# Patient Record
Sex: MALE | Race: WHITE | Employment: OTHER | URBAN - METROPOLITAN AREA
[De-identification: names, ages, dates, MRNs, and addresses within clinical notes are randomized per-mention and may not be internally consistent; named-entity substitution may affect disease eponyms.]

---

## 2017-10-06 ENCOUNTER — TELEPHONE (OUTPATIENT)
Dept: PRIMARY CARE CLINIC | Age: 57
End: 2017-10-06

## 2017-10-07 ENCOUNTER — OFFICE VISIT (OUTPATIENT)
Dept: PRIMARY CARE CLINIC | Age: 57
End: 2017-10-07

## 2017-10-07 VITALS
WEIGHT: 231 LBS | BODY MASS INDEX: 29.65 KG/M2 | DIASTOLIC BLOOD PRESSURE: 90 MMHG | HEIGHT: 74 IN | OXYGEN SATURATION: 94 % | RESPIRATION RATE: 18 BRPM | HEART RATE: 69 BPM | SYSTOLIC BLOOD PRESSURE: 142 MMHG | TEMPERATURE: 96.2 F

## 2017-10-07 DIAGNOSIS — R97.20 ELEVATED PSA: Primary | ICD-10-CM

## 2017-10-07 DIAGNOSIS — N40.1 BPH WITH URINARY OBSTRUCTION: ICD-10-CM

## 2017-10-07 DIAGNOSIS — N13.8 BPH WITH URINARY OBSTRUCTION: ICD-10-CM

## 2017-10-07 DIAGNOSIS — L73.9 FOLLICULITIS: ICD-10-CM

## 2017-10-07 DIAGNOSIS — R82.998 LEUKOCYTES IN URINE: ICD-10-CM

## 2017-10-07 LAB
BILIRUBIN, POC: ABNORMAL
BLOOD URINE, POC: ABNORMAL
CLARITY, POC: ABNORMAL
COLOR, POC: YELLOW
GLUCOSE URINE, POC: ABNORMAL
KETONES, POC: ABNORMAL
LEUKOCYTE EST, POC: ABNORMAL
NITRITE, POC: ABNORMAL
PH, POC: 6.5
PROTEIN, POC: ABNORMAL
SPECIFIC GRAVITY, POC: 1.01
UROBILINOGEN, POC: ABNORMAL

## 2017-10-07 PROCEDURE — 99213 OFFICE O/P EST LOW 20 MIN: CPT | Performed by: FAMILY MEDICINE

## 2017-10-07 PROCEDURE — 81002 URINALYSIS NONAUTO W/O SCOPE: CPT | Performed by: FAMILY MEDICINE

## 2017-10-07 RX ORDER — TRIAMCINOLONE ACETONIDE 1 MG/G
CREAM TOPICAL
Qty: 60 G | Refills: 1 | Status: SHIPPED | OUTPATIENT
Start: 2017-10-07 | End: 2018-05-30

## 2017-10-07 RX ORDER — TAMSULOSIN HYDROCHLORIDE 0.4 MG/1
0.4 CAPSULE ORAL DAILY
COMMUNITY
End: 2017-10-21 | Stop reason: SDUPTHER

## 2017-10-07 RX ORDER — CIPROFLOXACIN 500 MG/1
TABLET, FILM COATED ORAL
Qty: 20 TABLET | Refills: 0 | Status: SHIPPED | OUTPATIENT
Start: 2017-10-07 | End: 2017-10-17

## 2017-10-07 RX ORDER — CEPHALEXIN 500 MG/1
500 CAPSULE ORAL 3 TIMES DAILY
Qty: 30 CAPSULE | Refills: 0 | Status: SHIPPED | OUTPATIENT
Start: 2017-10-07 | End: 2017-10-07

## 2017-10-07 ASSESSMENT — PATIENT HEALTH QUESTIONNAIRE - PHQ9
SUM OF ALL RESPONSES TO PHQ QUESTIONS 1-9: 0
2. FEELING DOWN, DEPRESSED OR HOPELESS: 0
1. LITTLE INTEREST OR PLEASURE IN DOING THINGS: 0
SUM OF ALL RESPONSES TO PHQ9 QUESTIONS 1 & 2: 0

## 2017-10-07 NOTE — PROGRESS NOTES
Subjective  Yesi Jeffries, 61 y.o. male presents 10/7/2017 with  Chief Complaint   Patient presents with    Other     patient is here to discuss a prostates issue and other issues. would like to speak with doctor       HPI  Patient comes in for discussion of prostate problems. He admits he has difficulty urinating and sometimes has to force where he turns almost blue an effort to urinate. Patient has been seen by Dr. Grabiel South however, has not had a follow-up visit. Patient denies any flank pain, fever, chills, nausea, emesis, shortness of breath or chest pain. Patient admits he does get occasional abdominal distention and will straight catheter himself. Patient has not seen any hematuria for the past several months. [facility]    Patient Histories  History reviewed. No pertinent past medical history. Past Surgical History:   Procedure Laterality Date    HERNIA REPAIR      age 9    TONSILLECTOMY AND ADENOIDECTOMY      age 8     No Known Allergies  Social History     Social History    Marital status: Unknown     Spouse name: N/A    Number of children: N/A    Years of education: N/A     Occupational History    Not on file. Social History Main Topics    Smoking status: Former Smoker     Packs/day: 1.00     Years: 30.00     Types: Cigarettes     Quit date: 10/7/2010    Smokeless tobacco: Never Used    Alcohol use No    Drug use:      Frequency: 4.0 times per week     Types: Marijuana    Sexual activity: Not on file     Other Topics Concern    Not on file     Social History Narrative    No narrative on file     Family History   Problem Relation Age of Onset    Breast Cancer Mother     Cancer Brother      thyroid     No current outpatient prescriptions on file prior to visit. No current facility-administered medications on file prior to visit. Review of Systems   Respiratory: Negative for chest tightness, shortness of breath and wheezing.     Cardiovascular: Negative for chest pain, cephALEXin (KEFLEX) 500 MG capsule   4. Leukocytes in urine  Urine Culture     Orders Placed This Encounter   Procedures    Urine Culture     Standing Status:   Future     Number of Occurrences:   1     Standing Expiration Date:   10/7/2018     Order Specific Question:   Specify (ex-cath, midstream, cysto, etc)? Answer:   midstream    Psa screening     Standing Status:   Future     Standing Expiration Date:   2017    Comprehensive Metabolic Panel     Standing Status:   Future     Standing Expiration Date:   2017    CBC Auto Differential     Standing Status:   Future     Standing Expiration Date:   2017   [address] [place] [address] Urology     Referral Priority:   Routine     Referral Type:   Consult for Advice and Opinion     Referral Reason:   Specialty Services Required     Referred to Provider:   Natanael Saini MD     Requested Specialty:   Urology     Number of Visits Requested:   1    POCT Urinalysis no Micro     Orders Placed This Encounter   Medications    DISCONTD: cephALEXin (KEFLEX) 500 MG capsule     Sig: Take 1 capsule by mouth 3 times daily     Dispense:  30 capsule     Refill:  0    triamcinolone (KENALOG) 0.1 % cream     Sig: Apply topically 2 times daily. Dispense:  60 g     Refill:  1    ciprofloxacin (CIPRO) 500 MG tablet     Sig: One bid     Dispense:  20 tablet     Refill:  0     Medications Discontinued During This Encounter   Medication Reason    cephALEXin (KEFLEX) 500 MG capsule        PATIENT COUNSELED TO CONTINUE ALL CURRENT MEDS     Outpatient Prescriptions Marked as Taking for the 10/7/17 encounter (Office Visit) with Natasha Miner DO   Medication Sig Dispense Refill    triamcinolone (KENALOG) 0.1 % cream Apply topically 2 times daily.  60 g 1    [] ciprofloxacin (CIPRO) 500 MG tablet One bid 20 tablet 0    [DISCONTINUED] tamsulosin (FLOMAX) 0.4 MG capsule Take 0.4 mg by mouth daily             HEALTH MAINTENANCE LIST

## 2017-10-09 DIAGNOSIS — R82.998 LEUKOCYTES IN URINE: ICD-10-CM

## 2017-10-12 LAB
ORGANISM: ABNORMAL
URINE CULTURE, ROUTINE: ABNORMAL
URINE CULTURE, ROUTINE: ABNORMAL

## 2017-10-21 ENCOUNTER — OFFICE VISIT (OUTPATIENT)
Dept: PRIMARY CARE CLINIC | Age: 57
End: 2017-10-21

## 2017-10-21 VITALS
SYSTOLIC BLOOD PRESSURE: 130 MMHG | HEART RATE: 72 BPM | RESPIRATION RATE: 14 BRPM | BODY MASS INDEX: 29.52 KG/M2 | WEIGHT: 230 LBS | DIASTOLIC BLOOD PRESSURE: 88 MMHG | HEIGHT: 74 IN | TEMPERATURE: 97.9 F

## 2017-10-21 DIAGNOSIS — L30.9 ECZEMA, UNSPECIFIED TYPE: ICD-10-CM

## 2017-10-21 DIAGNOSIS — R35.0 URINARY FREQUENCY: Primary | ICD-10-CM

## 2017-10-21 DIAGNOSIS — N40.1 BENIGN PROSTATIC HYPERPLASIA WITH LOWER URINARY TRACT SYMPTOMS, SYMPTOM DETAILS UNSPECIFIED: ICD-10-CM

## 2017-10-21 LAB
BILIRUBIN, POC: NORMAL
BLOOD URINE, POC: NORMAL
CLARITY, POC: NORMAL
COLOR, POC: YELLOW
GLUCOSE URINE, POC: NORMAL
KETONES, POC: NORMAL
LEUKOCYTE EST, POC: NORMAL
NITRITE, POC: NORMAL
PH, POC: 6.5
PROTEIN, POC: NORMAL
SPECIFIC GRAVITY, POC: 1.01
UROBILINOGEN, POC: 3.5

## 2017-10-21 PROCEDURE — 81003 URINALYSIS AUTO W/O SCOPE: CPT | Performed by: FAMILY MEDICINE

## 2017-10-21 PROCEDURE — 99212 OFFICE O/P EST SF 10 MIN: CPT | Performed by: FAMILY MEDICINE

## 2017-10-21 RX ORDER — TRIAMCINOLONE ACETONIDE 1 MG/G
CREAM TOPICAL
Qty: 60 G | Refills: 1 | Status: SHIPPED | OUTPATIENT
Start: 2017-10-21 | End: 2018-05-30

## 2017-10-21 RX ORDER — TRIAMCINOLONE ACETONIDE 1 MG/G
CREAM TOPICAL
Qty: 60 G | Refills: 1 | Status: SHIPPED | OUTPATIENT
Start: 2017-10-21 | End: 2017-10-21 | Stop reason: SDUPTHER

## 2017-10-21 RX ORDER — TAMSULOSIN HYDROCHLORIDE 0.4 MG/1
0.4 CAPSULE ORAL 2 TIMES DAILY
Qty: 60 CAPSULE | Refills: 5 | Status: SHIPPED | OUTPATIENT
Start: 2017-10-21 | End: 2018-05-30 | Stop reason: SDUPTHER

## 2017-10-21 NOTE — PROGRESS NOTES
Orders Placed This Encounter   Procedures   [address] Urology     Referral Priority:   Routine     Referral Type:   Consult for Advice and Opinion     Referral Reason:   Specialty Services Required     Referred to Provider:   Mitra Waller MD     Requested Specialty:   Urology     Number of Visits Requested:   1    POCT Urinalysis No Micro (Auto)     Orders Placed This Encounter   Medications    DISCONTD: triamcinolone (KENALOG) 0.1 % cream     Sig: Apply topically 2 times daily. Dispense:  60 g     Refill:  1    tamsulosin (FLOMAX) 0.4 MG capsule     Sig: Take 1 capsule by mouth 2 times daily     Dispense:  60 capsule     Refill:  5    triamcinolone (KENALOG) 0.1 % cream     Sig: Apply topically 2 times daily. Dispense:  60 g     Refill:  1     Medications Discontinued During This Encounter   Medication Reason    tamsulosin (FLOMAX) 0.4 MG capsule Reorder    triamcinolone (KENALOG) 0.1 % cream Reorder       PATIENT COUNSELED TO CONTINUE ALL CURRENT MEDS     Outpatient Prescriptions Marked as Taking for the 10/21/17 encounter (Office Visit) with Ibrahima Hoyt DO   Medication Sig Dispense Refill    tamsulosin (FLOMAX) 0.4 MG capsule Take 1 capsule by mouth 2 times daily 60 capsule 5    triamcinolone (KENALOG) 0.1 % cream Apply topically 2 times daily. 60 g 1    triamcinolone (KENALOG) 0.1 % cream Apply topically 2 times daily.  60 g [facility] Maintenance   Topic Date Due    Hepatitis C screen  1960    HIV screen  03/17/1974    DTaP/Tdap/Td vaccine (1 - Tdap) 03/17/1978    Lipid screen  03/17/1999    Diabetes screen  03/17/1999    Colon cancer screen colonoscopy  03/17/2009    Flu vaccine (1) 11/21/2017 (Originally 9/1/2017)        [facility] Maintenance Due   Topic Date Due    Hepatitis C screen  1960    HIV screen  03/17/1974    DTaP/Tdap/Td

## 2017-10-22 ASSESSMENT — ENCOUNTER SYMPTOMS
SHORTNESS OF BREATH: 0
WHEEZING: 0
CHEST TIGHTNESS: 0
ABDOMINAL PAIN: 0

## 2017-10-29 ASSESSMENT — ENCOUNTER SYMPTOMS
ABDOMINAL PAIN: 0
COUGH: 0
SHORTNESS OF BREATH: 0
BLOOD IN STOOL: 0

## 2018-05-29 DIAGNOSIS — R35.0 URINARY FREQUENCY: ICD-10-CM

## 2018-05-29 DIAGNOSIS — N40.1 BENIGN PROSTATIC HYPERPLASIA WITH LOWER URINARY TRACT SYMPTOMS, SYMPTOM DETAILS UNSPECIFIED: ICD-10-CM

## 2018-05-29 RX ORDER — TAMSULOSIN HYDROCHLORIDE 0.4 MG/1
CAPSULE ORAL
Qty: 60 CAPSULE | Refills: 4 | OUTPATIENT
Start: 2018-05-29

## 2018-05-30 ENCOUNTER — OFFICE VISIT (OUTPATIENT)
Dept: PRIMARY CARE CLINIC | Age: 58
End: 2018-05-30

## 2018-05-30 VITALS
BODY MASS INDEX: 29.39 KG/M2 | OXYGEN SATURATION: 98 % | DIASTOLIC BLOOD PRESSURE: 76 MMHG | HEART RATE: 70 BPM | RESPIRATION RATE: 14 BRPM | TEMPERATURE: 97.1 F | SYSTOLIC BLOOD PRESSURE: 122 MMHG | WEIGHT: 229 LBS | HEIGHT: 74 IN

## 2018-05-30 DIAGNOSIS — L73.9 FOLLICULITIS: ICD-10-CM

## 2018-05-30 DIAGNOSIS — R35.0 URINARY FREQUENCY: Primary | ICD-10-CM

## 2018-05-30 DIAGNOSIS — N40.1 BENIGN PROSTATIC HYPERPLASIA WITH LOWER URINARY TRACT SYMPTOMS, SYMPTOM DETAILS UNSPECIFIED: ICD-10-CM

## 2018-05-30 PROCEDURE — 99212 OFFICE O/P EST SF 10 MIN: CPT | Performed by: FAMILY MEDICINE

## 2018-05-30 RX ORDER — TAMSULOSIN HYDROCHLORIDE 0.4 MG/1
0.4 CAPSULE ORAL 2 TIMES DAILY
Qty: 60 CAPSULE | Refills: 5 | Status: SHIPPED | OUTPATIENT
Start: 2018-05-30 | End: 2018-12-26 | Stop reason: SDUPTHER

## 2018-05-30 RX ORDER — CEPHALEXIN 500 MG/1
CAPSULE ORAL
Qty: 28 CAPSULE | Refills: 0 | Status: CANCELLED | OUTPATIENT
Start: 2018-05-30

## 2018-05-30 RX ORDER — DOXYCYCLINE 100 MG/1
CAPSULE ORAL
Status: CANCELLED | OUTPATIENT
Start: 2018-05-30

## 2018-05-30 ASSESSMENT — ENCOUNTER SYMPTOMS
DIARRHEA: 0
NAIL CHANGES: 0
EYE DISCHARGE: 0
APNEA: 0
SHORTNESS OF BREATH: 0
WHEEZING: 0
ABDOMINAL PAIN: 0
COLOR CHANGE: 0
EYE PAIN: 0
STRIDOR: 0
FACIAL SWELLING: 0
CHOKING: 0
SORE THROAT: 0
CONSTIPATION: 0
EYE REDNESS: 0
CHEST TIGHTNESS: 0
VOMITING: 0
PHOTOPHOBIA: 0
COUGH: 0
NAUSEA: 0
RHINORRHEA: 0

## 2018-06-01 ENCOUNTER — TELEPHONE (OUTPATIENT)
Dept: PRIMARY CARE CLINIC | Age: 58
End: 2018-06-01

## 2018-12-24 DIAGNOSIS — N40.1 BENIGN PROSTATIC HYPERPLASIA WITH LOWER URINARY TRACT SYMPTOMS, SYMPTOM DETAILS UNSPECIFIED: ICD-10-CM

## 2018-12-24 DIAGNOSIS — R35.0 URINARY FREQUENCY: ICD-10-CM

## 2018-12-26 ENCOUNTER — OFFICE VISIT (OUTPATIENT)
Dept: PRIMARY CARE CLINIC | Age: 58
End: 2018-12-26

## 2018-12-26 VITALS
SYSTOLIC BLOOD PRESSURE: 128 MMHG | HEIGHT: 74 IN | WEIGHT: 228 LBS | DIASTOLIC BLOOD PRESSURE: 80 MMHG | HEART RATE: 68 BPM | BODY MASS INDEX: 29.26 KG/M2 | RESPIRATION RATE: 16 BRPM | TEMPERATURE: 97.5 F | OXYGEN SATURATION: 95 %

## 2018-12-26 DIAGNOSIS — R97.20 ELEVATED PSA: ICD-10-CM

## 2018-12-26 DIAGNOSIS — Z12.5 PROSTATE CANCER SCREENING: Primary | ICD-10-CM

## 2018-12-26 DIAGNOSIS — M19.011 PRIMARY OSTEOARTHRITIS OF BOTH SHOULDERS: ICD-10-CM

## 2018-12-26 DIAGNOSIS — R35.0 URINARY FREQUENCY: ICD-10-CM

## 2018-12-26 DIAGNOSIS — M19.012 PRIMARY OSTEOARTHRITIS OF BOTH SHOULDERS: ICD-10-CM

## 2018-12-26 DIAGNOSIS — N40.1 BENIGN PROSTATIC HYPERPLASIA WITH LOWER URINARY TRACT SYMPTOMS, SYMPTOM DETAILS UNSPECIFIED: ICD-10-CM

## 2018-12-26 PROCEDURE — 99212 OFFICE O/P EST SF 10 MIN: CPT | Performed by: FAMILY MEDICINE

## 2018-12-26 RX ORDER — TAMSULOSIN HYDROCHLORIDE 0.4 MG/1
0.4 CAPSULE ORAL 2 TIMES DAILY
Qty: 180 CAPSULE | Refills: 1 | Status: SHIPPED | OUTPATIENT
Start: 2018-12-26 | End: 2019-04-16 | Stop reason: SDUPTHER

## 2018-12-26 RX ORDER — TAMSULOSIN HYDROCHLORIDE 0.4 MG/1
CAPSULE ORAL
Qty: 60 CAPSULE | Refills: 4 | OUTPATIENT
Start: 2018-12-26

## 2018-12-26 RX ORDER — PREDNISONE 10 MG/1
TABLET ORAL
Qty: 25 TABLET | Refills: 0 | Status: SHIPPED | OUTPATIENT
Start: 2018-12-26 | End: 2019-01-09

## 2018-12-26 ASSESSMENT — PATIENT HEALTH QUESTIONNAIRE - PHQ9
SUM OF ALL RESPONSES TO PHQ9 QUESTIONS 1 & 2: 0
SUM OF ALL RESPONSES TO PHQ QUESTIONS 1-9: 0
SUM OF ALL RESPONSES TO PHQ QUESTIONS 1-9: 0
2. FEELING DOWN, DEPRESSED OR HOPELESS: 0
1. LITTLE INTEREST OR PLEASURE IN DOING THINGS: 0

## 2019-01-02 ASSESSMENT — ENCOUNTER SYMPTOMS
PHOTOPHOBIA: 0
EYE DISCHARGE: 0
NAUSEA: 0
CHEST TIGHTNESS: 0
CONSTIPATION: 0
RESPIRATORY NEGATIVE: 1
APNEA: 0
VOMITING: 0
ABDOMINAL PAIN: 0
BLOOD IN STOOL: 0
BACK PAIN: 0
DIARRHEA: 0
EYES NEGATIVE: 1
SHORTNESS OF BREATH: 0
COUGH: 0
GASTROINTESTINAL NEGATIVE: 1

## 2019-01-30 ENCOUNTER — OFFICE VISIT (OUTPATIENT)
Dept: PRIMARY CARE CLINIC | Age: 59
End: 2019-01-30
Payer: COMMERCIAL

## 2019-01-30 VITALS
RESPIRATION RATE: 14 BRPM | DIASTOLIC BLOOD PRESSURE: 78 MMHG | WEIGHT: 228 LBS | HEIGHT: 74 IN | TEMPERATURE: 97.8 F | HEART RATE: 59 BPM | BODY MASS INDEX: 29.26 KG/M2 | OXYGEN SATURATION: 96 % | SYSTOLIC BLOOD PRESSURE: 132 MMHG

## 2019-01-30 DIAGNOSIS — R30.0 BURNING WITH URINATION: ICD-10-CM

## 2019-01-30 DIAGNOSIS — N39.0 URINARY TRACT INFECTION WITHOUT HEMATURIA, SITE UNSPECIFIED: Primary | ICD-10-CM

## 2019-01-30 DIAGNOSIS — N39.0 URINARY TRACT INFECTION WITHOUT HEMATURIA, SITE UNSPECIFIED: ICD-10-CM

## 2019-01-30 LAB
BILIRUBIN, POC: NORMAL
BLOOD URINE, POC: NORMAL
CLARITY, POC: NORMAL
COLOR, POC: YELLOW
GLUCOSE URINE, POC: NORMAL
KETONES, POC: NORMAL
LEUKOCYTE EST, POC: NORMAL
NITRITE, POC: NORMAL
PH, POC: 6
PROTEIN, POC: NORMAL
SPECIFIC GRAVITY, POC: 1.02
UROBILINOGEN, POC: 3.5

## 2019-01-30 PROCEDURE — 81003 URINALYSIS AUTO W/O SCOPE: CPT | Performed by: NURSE PRACTITIONER

## 2019-01-30 PROCEDURE — 99213 OFFICE O/P EST LOW 20 MIN: CPT | Performed by: NURSE PRACTITIONER

## 2019-01-30 RX ORDER — NITROFURANTOIN 25; 75 MG/1; MG/1
100 CAPSULE ORAL 2 TIMES DAILY
Qty: 10 CAPSULE | Refills: 0 | Status: SHIPPED | OUTPATIENT
Start: 2019-01-30 | End: 2019-02-04

## 2019-01-30 ASSESSMENT — ENCOUNTER SYMPTOMS
NAUSEA: 0
VOMITING: 0

## 2019-02-02 LAB
ORGANISM: ABNORMAL
URINE CULTURE, ROUTINE: ABNORMAL
URINE CULTURE, ROUTINE: ABNORMAL

## 2019-02-04 ENCOUNTER — TELEPHONE (OUTPATIENT)
Dept: PRIMARY CARE CLINIC | Age: 59
End: 2019-02-04

## 2019-02-04 DIAGNOSIS — N39.0 URINARY TRACT INFECTION WITHOUT HEMATURIA, SITE UNSPECIFIED: Primary | ICD-10-CM

## 2019-02-04 RX ORDER — CIPROFLOXACIN 500 MG/1
500 TABLET, FILM COATED ORAL 2 TIMES DAILY
Qty: 14 TABLET | Refills: 0 | Status: SHIPPED | OUTPATIENT
Start: 2019-02-04 | End: 2019-02-11

## 2019-02-11 ENCOUNTER — OFFICE VISIT (OUTPATIENT)
Dept: UROLOGY | Age: 59
End: 2019-02-11
Payer: COMMERCIAL

## 2019-02-11 VITALS
HEIGHT: 74 IN | WEIGHT: 225 LBS | BODY MASS INDEX: 28.88 KG/M2 | SYSTOLIC BLOOD PRESSURE: 150 MMHG | HEART RATE: 77 BPM | DIASTOLIC BLOOD PRESSURE: 96 MMHG

## 2019-02-11 DIAGNOSIS — R97.20 ELEVATED PSA: Primary | ICD-10-CM

## 2019-02-11 DIAGNOSIS — R35.0 URINARY FREQUENCY: ICD-10-CM

## 2019-02-11 LAB
BILIRUBIN, POC: NORMAL
BLOOD URINE, POC: NORMAL
CLARITY, POC: CLEAR
COLOR, POC: YELLOW
GLUCOSE URINE, POC: NORMAL
KETONES, POC: NORMAL
LEUKOCYTE EST, POC: NORMAL
NITRITE, POC: NORMAL
PH, POC: 5.5
POST VOID RESIDUAL (PVR): >417 ML
PROTEIN, POC: NORMAL
SPECIFIC GRAVITY, POC: 1.01
UROBILINOGEN, POC: 0.2

## 2019-02-11 PROCEDURE — 51798 US URINE CAPACITY MEASURE: CPT | Performed by: UROLOGY

## 2019-02-11 PROCEDURE — 99243 OFF/OP CNSLTJ NEW/EST LOW 30: CPT | Performed by: UROLOGY

## 2019-02-11 PROCEDURE — 81003 URINALYSIS AUTO W/O SCOPE: CPT | Performed by: UROLOGY

## 2019-02-22 ENCOUNTER — TELEPHONE (OUTPATIENT)
Dept: PRIMARY CARE CLINIC | Age: 59
End: 2019-02-22

## 2019-02-25 ENCOUNTER — OFFICE VISIT (OUTPATIENT)
Dept: UROLOGY | Age: 59
End: 2019-02-25
Payer: COMMERCIAL

## 2019-02-25 VITALS
DIASTOLIC BLOOD PRESSURE: 88 MMHG | WEIGHT: 225 LBS | BODY MASS INDEX: 28.88 KG/M2 | HEIGHT: 74 IN | HEART RATE: 74 BPM | SYSTOLIC BLOOD PRESSURE: 126 MMHG

## 2019-02-25 DIAGNOSIS — R97.20 ELEVATED PSA: Primary | ICD-10-CM

## 2019-02-25 PROCEDURE — 99213 OFFICE O/P EST LOW 20 MIN: CPT | Performed by: UROLOGY

## 2019-03-18 ENCOUNTER — TELEPHONE (OUTPATIENT)
Dept: PRIMARY CARE CLINIC | Age: 59
End: 2019-03-18

## 2019-04-16 ENCOUNTER — HOSPITAL ENCOUNTER (OUTPATIENT)
Age: 59
Setting detail: SPECIMEN
Discharge: HOME | End: 2019-04-16

## 2019-04-16 ENCOUNTER — OFFICE VISIT (OUTPATIENT)
Dept: FAMILY MEDICINE CLINIC | Age: 59
End: 2019-04-16
Payer: COMMERCIAL

## 2019-04-16 VITALS
SYSTOLIC BLOOD PRESSURE: 144 MMHG | OXYGEN SATURATION: 98 % | DIASTOLIC BLOOD PRESSURE: 82 MMHG | WEIGHT: 223.8 LBS | BODY MASS INDEX: 28.73 KG/M2 | HEART RATE: 80 BPM

## 2019-04-16 DIAGNOSIS — N40.1 BENIGN PROSTATIC HYPERPLASIA WITH LOWER URINARY TRACT SYMPTOMS, SYMPTOM DETAILS UNSPECIFIED: ICD-10-CM

## 2019-04-16 DIAGNOSIS — Z71.1 CONCERN ABOUT STD IN MALE WITHOUT DIAGNOSIS: Primary | ICD-10-CM

## 2019-04-16 DIAGNOSIS — R35.0 URINARY FREQUENCY: ICD-10-CM

## 2019-04-16 DIAGNOSIS — Z71.1 CONCERN ABOUT STD IN MALE WITHOUT DIAGNOSIS: ICD-10-CM

## 2019-04-16 LAB
BACTERIA: ABNORMAL /HPF
BILIRUBIN URINE: NEGATIVE
BLOOD, URINE: ABNORMAL
CLARITY: ABNORMAL
COLOR: YELLOW
EPITHELIAL CELLS, UA: ABNORMAL /HPF (ref 0–5)
GLUCOSE URINE: NEGATIVE MG/DL
HEPATITIS C ANTIBODY INTERPRETATION: NORMAL
HYALINE CASTS: ABNORMAL /HPF (ref 0–5)
KETONES, URINE: NEGATIVE MG/DL
LEUKOCYTE ESTERASE, URINE: ABNORMAL
NITRITE, URINE: NEGATIVE
PH UA: 6.5 (ref 5–9)
PROTEIN UA: NEGATIVE MG/DL
RBC UA: ABNORMAL /HPF (ref 0–5)
SPECIFIC GRAVITY UA: 1.02 (ref 1–1.03)
UROBILINOGEN, URINE: 0.2 E.U./DL
WBC UA: ABNORMAL /HPF (ref 0–5)

## 2019-04-16 PROCEDURE — 87491 CHLMYD TRACH DNA AMP PROBE: CPT

## 2019-04-16 PROCEDURE — 87086 URINE CULTURE/COLONY COUNT: CPT

## 2019-04-16 PROCEDURE — 87661 TRICHOMONAS VAGINALIS AMPLIF: CPT

## 2019-04-16 PROCEDURE — 87389 HIV-1 AG W/HIV-1&-2 AB AG IA: CPT

## 2019-04-16 PROCEDURE — 87077 CULTURE AEROBIC IDENTIFY: CPT

## 2019-04-16 PROCEDURE — 87591 N.GONORRHOEAE DNA AMP PROB: CPT

## 2019-04-16 PROCEDURE — 81001 URINALYSIS AUTO W/SCOPE: CPT

## 2019-04-16 PROCEDURE — 86803 HEPATITIS C AB TEST: CPT

## 2019-04-16 PROCEDURE — 87186 SC STD MICRODIL/AGAR DIL: CPT

## 2019-04-16 PROCEDURE — 99214 OFFICE O/P EST MOD 30 MIN: CPT | Performed by: FAMILY MEDICINE

## 2019-04-16 RX ORDER — TAMSULOSIN HYDROCHLORIDE 0.4 MG/1
0.4 CAPSULE ORAL 2 TIMES DAILY
Qty: 180 CAPSULE | Refills: 1 | Status: SHIPPED | OUTPATIENT
Start: 2019-04-16 | End: 2020-01-07 | Stop reason: SDUPTHER

## 2019-04-16 ASSESSMENT — ENCOUNTER SYMPTOMS
RHINORRHEA: 0
COUGH: 0
CONSTIPATION: 0
WHEEZING: 0
SHORTNESS OF BREATH: 0
ABDOMINAL PAIN: 0
SORE THROAT: 0
DIARRHEA: 0

## 2019-04-16 ASSESSMENT — PATIENT HEALTH QUESTIONNAIRE - PHQ9: DEPRESSION UNABLE TO ASSESS: PT REFUSES

## 2019-04-16 NOTE — PROGRESS NOTES
Soft. He exhibits no distension. There is no tenderness. There is no rebound and no guarding. Neurological: He is alert and oriented to person, place, and time. Skin: Skin is warm and dry. Psychiatric: He has a normal mood and affect. His behavior is normal.   Vitals reviewed. Assessment/Plan:  60 y.o. male here mainly for STD concern:  - checking urine for STD  - he is quite anxious today but declines any interventions; refused depression screen  - he will f/u with urology regarding possible prostate biopsy     Diagnosis Orders   1. Concern about STD in male without diagnosis  C.trachomatis N.gonorrhoeae DNA, Urine    HIV-1,2 Combo Ag/Ab By MOISÉS, Reflexive Panel    Hepatitis C Antibody    Urinalysis    Urine Culture    Urine Trichomonas Evaluation   2. Urinary frequency  tamsulosin (FLOMAX) 0.4 MG capsule   3. Benign prostatic hyperplasia with lower urinary tract symptoms, symptom details unspecified  tamsulosin (FLOMAX) 0.4 MG capsule        Return if symptoms worsen or fail to improve.     Casandra Palomino MD

## 2019-04-18 ENCOUNTER — NURSE ONLY (OUTPATIENT)
Dept: FAMILY MEDICINE CLINIC | Age: 59
End: 2019-04-18
Payer: COMMERCIAL

## 2019-04-18 DIAGNOSIS — Z71.1 CONCERN ABOUT STD IN MALE WITHOUT DIAGNOSIS: Primary | ICD-10-CM

## 2019-04-18 DIAGNOSIS — R36.9 PENILE DISCHARGE: Primary | ICD-10-CM

## 2019-04-18 PROCEDURE — 96372 THER/PROPH/DIAG INJ SC/IM: CPT | Performed by: FAMILY MEDICINE

## 2019-04-18 RX ORDER — AZITHROMYCIN 500 MG/1
1000 TABLET, FILM COATED ORAL ONCE
Qty: 2 TABLET | Refills: 0 | Status: SHIPPED | OUTPATIENT
Start: 2019-04-18 | End: 2019-04-18

## 2019-04-18 RX ORDER — CEFTRIAXONE SODIUM 250 MG/1
250 INJECTION, POWDER, FOR SOLUTION INTRAMUSCULAR; INTRAVENOUS ONCE
Status: COMPLETED | OUTPATIENT
Start: 2019-04-18 | End: 2019-04-18

## 2019-04-18 RX ADMIN — CEFTRIAXONE SODIUM 250 MG: 250 INJECTION, POWDER, FOR SOLUTION INTRAMUSCULAR; INTRAVENOUS at 11:53

## 2019-04-18 NOTE — PROGRESS NOTES
Pt is here today for a Ceftriaxone injection. Pt received it in his left glut and tolerated the procedure well.

## 2019-04-19 LAB
HIV 1,2 COMBO ANTIGEN/ANTIBODY: NEGATIVE
ORGANISM: ABNORMAL
URINE CULTURE, ROUTINE: ABNORMAL
URINE CULTURE, ROUTINE: ABNORMAL

## 2019-04-20 LAB
SPECIMEN SOURCE: NORMAL
T. VAGINALIS AMPLIFIED: NEGATIVE

## 2019-04-22 ENCOUNTER — TELEPHONE (OUTPATIENT)
Dept: FAMILY MEDICINE CLINIC | Age: 59
End: 2019-04-22

## 2019-04-22 DIAGNOSIS — Z71.1 CONCERN ABOUT STD IN MALE WITHOUT DIAGNOSIS: ICD-10-CM

## 2019-04-22 DIAGNOSIS — N30.00 ACUTE CYSTITIS WITHOUT HEMATURIA: Primary | ICD-10-CM

## 2019-04-22 RX ORDER — SULFAMETHOXAZOLE AND TRIMETHOPRIM 800; 160 MG/1; MG/1
1 TABLET ORAL 2 TIMES DAILY
Qty: 14 TABLET | Refills: 0 | Status: SHIPPED | OUTPATIENT
Start: 2019-04-22 | End: 2019-04-29

## 2019-04-22 NOTE — TELEPHONE ENCOUNTER
He received an IM injection of ceftriaxone. That should cover the klebsiella in the urine. I've also added some extra days of bactrim if you're still having symptoms. I can do a blood test for herpes but it will not help you decide if you just contracted it or not. It will only tell me if you were exposed some time in your lifetime. Since you had no rashes, it wasn't tested. I can still order it if you like.

## 2019-04-22 NOTE — TELEPHONE ENCOUNTER
Pt is concerned about his UTI, states he never received a medication to treat that. Pt would like to know if one can be sent in. Pt also would like to know if he was tested for herpes or not, he states he voiced this concern to Dr Vicky Archer during the visit.

## 2019-04-23 LAB
C. TRACHOMATIS DNA ,URINE: NEGATIVE
N. GONORRHOEAE DNA, URINE: NEGATIVE

## 2019-05-23 DIAGNOSIS — R97.20 ELEVATED PSA: ICD-10-CM

## 2019-05-23 LAB — PROSTATE SPECIFIC ANTIGEN: 11.68 NG/ML (ref 0–5.4)

## 2019-05-24 ENCOUNTER — OFFICE VISIT (OUTPATIENT)
Dept: UROLOGY | Age: 59
End: 2019-05-24
Payer: COMMERCIAL

## 2019-05-24 VITALS
WEIGHT: 225 LBS | OXYGEN SATURATION: 98 % | HEART RATE: 74 BPM | HEIGHT: 74 IN | SYSTOLIC BLOOD PRESSURE: 132 MMHG | BODY MASS INDEX: 28.88 KG/M2 | DIASTOLIC BLOOD PRESSURE: 84 MMHG

## 2019-05-24 DIAGNOSIS — R97.20 ELEVATED PSA: Primary | ICD-10-CM

## 2019-05-24 LAB
BILIRUBIN, POC: ABNORMAL
BLOOD URINE, POC: ABNORMAL
CLARITY, POC: CLEAR
COLOR, POC: YELLOW
GLUCOSE URINE, POC: ABNORMAL
KETONES, POC: ABNORMAL
LEUKOCYTE EST, POC: ABNORMAL
NITRITE, POC: ABNORMAL
PH, POC: 5.5
PROTEIN, POC: ABNORMAL
SPECIFIC GRAVITY, POC: 1.01
UROBILINOGEN, POC: 0.2

## 2019-05-24 PROCEDURE — 81003 URINALYSIS AUTO W/O SCOPE: CPT | Performed by: UROLOGY

## 2019-05-24 PROCEDURE — 99214 OFFICE O/P EST MOD 30 MIN: CPT | Performed by: UROLOGY

## 2019-05-27 LAB
ORGANISM: ABNORMAL
URINE CULTURE, ROUTINE: ABNORMAL
URINE CULTURE, ROUTINE: ABNORMAL

## 2019-05-27 RX ORDER — CEPHALEXIN 500 MG/1
500 CAPSULE ORAL EVERY 12 HOURS
Qty: 20 CAPSULE | Refills: 0 | Status: SHIPPED | OUTPATIENT
Start: 2019-05-27 | End: 2019-09-05 | Stop reason: ALTCHOICE

## 2019-05-31 ENCOUNTER — TELEPHONE (OUTPATIENT)
Dept: FAMILY MEDICINE CLINIC | Age: 59
End: 2019-05-31

## 2019-06-28 ENCOUNTER — OFFICE VISIT (OUTPATIENT)
Dept: FAMILY MEDICINE CLINIC | Age: 59
End: 2019-06-28
Payer: COMMERCIAL

## 2019-06-28 VITALS
DIASTOLIC BLOOD PRESSURE: 82 MMHG | WEIGHT: 226 LBS | HEIGHT: 74 IN | BODY MASS INDEX: 29 KG/M2 | SYSTOLIC BLOOD PRESSURE: 136 MMHG

## 2019-06-28 DIAGNOSIS — N30.00 ACUTE CYSTITIS WITHOUT HEMATURIA: Primary | ICD-10-CM

## 2019-06-28 DIAGNOSIS — R39.9 UTI SYMPTOMS: ICD-10-CM

## 2019-06-28 LAB
BILIRUBIN, POC: ABNORMAL
BLOOD URINE, POC: ABNORMAL
CLARITY, POC: ABNORMAL
COLOR, POC: ABNORMAL
GLUCOSE URINE, POC: ABNORMAL
KETONES, POC: ABNORMAL
LEUKOCYTE EST, POC: ABNORMAL
NITRITE, POC: ABNORMAL
PH, POC: 6
PROTEIN, POC: ABNORMAL
SPECIFIC GRAVITY, POC: 1.03
UROBILINOGEN, POC: ABNORMAL

## 2019-06-28 PROCEDURE — 81003 URINALYSIS AUTO W/O SCOPE: CPT | Performed by: NURSE PRACTITIONER

## 2019-06-28 PROCEDURE — 99213 OFFICE O/P EST LOW 20 MIN: CPT | Performed by: NURSE PRACTITIONER

## 2019-06-28 RX ORDER — CIPROFLOXACIN 500 MG/1
500 TABLET, FILM COATED ORAL 2 TIMES DAILY
Qty: 14 TABLET | Refills: 0 | Status: SHIPPED | OUTPATIENT
Start: 2019-06-28 | End: 2019-07-05

## 2019-06-28 ASSESSMENT — PATIENT HEALTH QUESTIONNAIRE - PHQ9
2. FEELING DOWN, DEPRESSED OR HOPELESS: 0
SUM OF ALL RESPONSES TO PHQ9 QUESTIONS 1 & 2: 0
SUM OF ALL RESPONSES TO PHQ QUESTIONS 1-9: 0
SUM OF ALL RESPONSES TO PHQ QUESTIONS 1-9: 0
1. LITTLE INTEREST OR PLEASURE IN DOING THINGS: 0

## 2019-06-28 NOTE — PROGRESS NOTES
Subjective:     Patient ID: Corrina Hernandes is a 60 y.o. male who presentstoday for:  Chief Complaint   Patient presents with    Urinary Tract Infection     Since a wednesday notice urine very cloudy. Pt refuse vitals     Urinary Tract Infection    This is a recurrent problem. The current episode started in the past 7 days. The problem occurs every urination. The problem has been gradually worsening. The quality of the pain is described as burning. The pain is mild. There has been no fever. He is sexually active. Associated symptoms include flank pain, hematuria and urgency. Pertinent negatives include no chills or discharge. He has tried nothing for the symptoms. His past medical history is significant for catheterization, recurrent UTIs and urinary stasis. No past medical history on file. Current Outpatient Medications on File Prior to Visit   Medication Sig Dispense Refill    cephALEXin (KEFLEX) 500 MG capsule Take 1 capsule by mouth every 12 hours 20 capsule 0    tamsulosin (FLOMAX) 0.4 MG capsule Take 1 capsule by mouth 2 times daily 180 capsule 1     No current facility-administered medications on file prior to visit.          Past Surgical History:   Procedure Laterality Date    HERNIA REPAIR      age 9    TONSILLECTOMY AND ADENOIDECTOMY      age 8     Family History   Problem Relation Age of Onset    Breast Cancer Mother     Cancer Brother         thyroid     Social History     Socioeconomic History    Marital status: Single     Spouse name: Not on file    Number of children: Not on file    Years of education: Not on file    Highest education level: Not on file   Occupational History    Not on file   Social Needs    Financial resource strain: Not on file    Food insecurity:     Worry: Not on file     Inability: Not on file    Transportation needs:     Medical: Not on file     Non-medical: Not on file   Tobacco Use    Smoking status: Former Smoker     Packs/day: 1.00     Years: 30.00 Pack years: 30.00     Types: Cigarettes     Last attempt to quit: 10/7/2010     Years since quittin.7    Smokeless tobacco: Never Used   Substance and Sexual Activity    Alcohol use: No    Drug use: Yes     Frequency: 4.0 times per week     Types: Marijuana    Sexual activity: Not on file   Lifestyle    Physical activity:     Days per week: Not on file     Minutes per session: Not on file    Stress: Not on file   Relationships    Social connections:     Talks on phone: Not on file     Gets together: Not on file     Attends Nondenominational service: Not on file     Active member of club or organization: Not on file     Attends meetings of clubs or organizations: Not on file     Relationship status: Not on file    Intimate partner violence:     Fear of current or ex partner: Not on file     Emotionally abused: Not on file     Physically abused: Not on file     Forced sexual activity: Not on file   Other Topics Concern    Not on file   Social History Narrative    Not on file     Allergies:  Patient has no known allergies. Review of Systems   Constitutional: Negative for chills, fatigue and fever. Genitourinary: Positive for dysuria, flank pain, hematuria and urgency. Dark Urine   Psychiatric/Behavioral: Negative. Objective:    /82 Comment: Pt states his good don't need vitals  Ht 6' 2\" (1.88 m)   Wt 226 lb (102.5 kg)   BMI 29.02 kg/m²     Physical Exam   Constitutional: He is oriented to person, place, and time. Vital signs are normal. He appears well-developed and well-nourished. No distress. HENT:   Head: Normocephalic and atraumatic. Eyes: Lids are normal.   Cardiovascular: Normal rate, regular rhythm, normal heart sounds and intact distal pulses. No murmur heard. Pulmonary/Chest: Effort normal and breath sounds normal. No respiratory distress. He has no wheezes. He has no rales. He exhibits no tenderness. Abdominal: Normal appearance.    Musculoskeletal: Normal range of treatment. Advised to go to the emergency room if symptoms rapidly worsen. Reviewed with the patient: currentclinical status, medications, activities and diet. Advised patient to drink plenty of fluids and to take the antibiotic with food. If any problems occur, an appointment should be made or ER visit if severe. Because of the risk with ANY antibiotic of C. Difficile colitis if persistent diarrhea or abdominal pain or any concerning symptoms, we should be notified. To reduce this risk, a probiotic pill, yogurt or other preparations containing active cultures should be ingested daily -particularly while on the antibiotic. If any persistent symptoms of illness, follow up appointment should be made in a timely fashion with a provider. Side effects, adverse effects of the medicationprescribed today, as well as treatment plan/ rationale and result expectations havebeen discussed with the patient who expresses understanding and desires to proceed. Pt instructions reviewed and given to patient.     Close follow up to evaluate treatment resultsand for coordination of care. I have reviewed the patient's medical historyin detail and updated the computerized patient record.     Nelda Moise, APRN - CNP

## 2019-06-29 DIAGNOSIS — N30.00 ACUTE CYSTITIS WITHOUT HEMATURIA: ICD-10-CM

## 2019-07-02 LAB
ORGANISM: ABNORMAL
URINE CULTURE, ROUTINE: ABNORMAL
URINE CULTURE, ROUTINE: ABNORMAL

## 2019-09-05 ENCOUNTER — OFFICE VISIT (OUTPATIENT)
Dept: UROLOGY | Age: 59
End: 2019-09-05
Payer: COMMERCIAL

## 2019-09-05 VITALS
HEIGHT: 74 IN | DIASTOLIC BLOOD PRESSURE: 84 MMHG | BODY MASS INDEX: 28.88 KG/M2 | WEIGHT: 225 LBS | SYSTOLIC BLOOD PRESSURE: 128 MMHG | HEART RATE: 77 BPM

## 2019-09-05 DIAGNOSIS — N39.0 URINARY TRACT INFECTION WITHOUT HEMATURIA, SITE UNSPECIFIED: ICD-10-CM

## 2019-09-05 DIAGNOSIS — R33.9 URINARY RETENTION: Primary | ICD-10-CM

## 2019-09-05 DIAGNOSIS — R97.20 ELEVATED PSA: ICD-10-CM

## 2019-09-05 LAB
BILIRUBIN, POC: ABNORMAL
BLOOD URINE, POC: ABNORMAL
CLARITY, POC: CLEAR
COLOR, POC: YELLOW
GLUCOSE URINE, POC: ABNORMAL
KETONES, POC: ABNORMAL
LEUKOCYTE EST, POC: ABNORMAL
NITRITE, POC: ABNORMAL
PH, POC: 7
POST VOID RESIDUAL (PVR): 367 ML
PROTEIN, POC: ABNORMAL
SPECIFIC GRAVITY, POC: 1.01
UROBILINOGEN, POC: 0.2

## 2019-09-05 PROCEDURE — 99214 OFFICE O/P EST MOD 30 MIN: CPT | Performed by: UROLOGY

## 2019-09-05 PROCEDURE — 81003 URINALYSIS AUTO W/O SCOPE: CPT | Performed by: UROLOGY

## 2019-09-05 PROCEDURE — 51798 US URINE CAPACITY MEASURE: CPT | Performed by: UROLOGY

## 2019-09-08 LAB
ORGANISM: ABNORMAL
URINE CULTURE, ROUTINE: ABNORMAL

## 2019-09-09 RX ORDER — CIPROFLOXACIN 500 MG/1
500 TABLET, FILM COATED ORAL 2 TIMES DAILY
Qty: 14 TABLET | Refills: 0 | Status: SHIPPED | OUTPATIENT
Start: 2019-09-09 | End: 2019-12-04 | Stop reason: ALTCHOICE

## 2019-12-04 ENCOUNTER — OFFICE VISIT (OUTPATIENT)
Dept: FAMILY MEDICINE CLINIC | Age: 59
End: 2019-12-04

## 2019-12-04 DIAGNOSIS — N40.1 BENIGN PROSTATIC HYPERPLASIA WITH INCOMPLETE BLADDER EMPTYING: ICD-10-CM

## 2019-12-04 DIAGNOSIS — N39.0 RECURRENT UTI: ICD-10-CM

## 2019-12-04 DIAGNOSIS — R31.0 GROSS HEMATURIA: ICD-10-CM

## 2019-12-04 DIAGNOSIS — R39.14 BENIGN PROSTATIC HYPERPLASIA WITH INCOMPLETE BLADDER EMPTYING: ICD-10-CM

## 2019-12-04 DIAGNOSIS — R35.0 FREQUENCY OF URINATION: Primary | ICD-10-CM

## 2019-12-04 LAB
BILIRUBIN, POC: ABNORMAL
BLOOD URINE, POC: 80
CLARITY, POC: CLEAR
COLOR, POC: ABNORMAL
GLUCOSE URINE, POC: ABNORMAL
KETONES, POC: ABNORMAL
LEUKOCYTE EST, POC: ABNORMAL
NITRITE, POC: ABNORMAL
PH, POC: 5.5
PROTEIN, POC: ABNORMAL
SPECIFIC GRAVITY, POC: 1.03
UROBILINOGEN, POC: 3.5

## 2019-12-04 PROCEDURE — 81003 URINALYSIS AUTO W/O SCOPE: CPT | Performed by: NURSE PRACTITIONER

## 2019-12-04 PROCEDURE — 99213 OFFICE O/P EST LOW 20 MIN: CPT | Performed by: NURSE PRACTITIONER

## 2019-12-04 RX ORDER — DUTASTERIDE 0.5 MG/1
0.5 CAPSULE, LIQUID FILLED ORAL DAILY
Qty: 30 CAPSULE | Refills: 0 | Status: SHIPPED | OUTPATIENT
Start: 2019-12-04 | End: 2020-09-14 | Stop reason: ALTCHOICE

## 2019-12-05 ENCOUNTER — OFFICE VISIT (OUTPATIENT)
Dept: UROLOGY | Age: 59
End: 2019-12-05
Payer: COMMERCIAL

## 2019-12-05 VITALS
WEIGHT: 234 LBS | DIASTOLIC BLOOD PRESSURE: 90 MMHG | SYSTOLIC BLOOD PRESSURE: 150 MMHG | HEIGHT: 74 IN | HEART RATE: 71 BPM | BODY MASS INDEX: 30.03 KG/M2

## 2019-12-05 DIAGNOSIS — R97.20 ELEVATED PSA: Primary | ICD-10-CM

## 2019-12-05 PROCEDURE — 99211 OFF/OP EST MAY X REQ PHY/QHP: CPT | Performed by: UROLOGY

## 2019-12-07 LAB — URINE CULTURE, ROUTINE: NORMAL

## 2019-12-17 VITALS
DIASTOLIC BLOOD PRESSURE: 77 MMHG | SYSTOLIC BLOOD PRESSURE: 132 MMHG | OXYGEN SATURATION: 99 % | RESPIRATION RATE: 29 BRPM | TEMPERATURE: 98.6 F | HEART RATE: 88 BPM

## 2019-12-17 PROBLEM — R31.0 GROSS HEMATURIA: Status: ACTIVE | Noted: 2019-12-17

## 2019-12-17 PROBLEM — N39.0 RECURRENT UTI: Status: ACTIVE | Noted: 2019-12-17

## 2020-01-07 RX ORDER — TAMSULOSIN HYDROCHLORIDE 0.4 MG/1
0.4 CAPSULE ORAL 2 TIMES DAILY
Qty: 180 CAPSULE | Refills: 1 | Status: SHIPPED | OUTPATIENT
Start: 2020-01-07 | End: 2022-04-15

## 2020-01-07 NOTE — TELEPHONE ENCOUNTER
Rx requested:  Requested Prescriptions     Pending Prescriptions Disp Refills    tamsulosin (FLOMAX) 0.4 MG capsule 180 capsule 1     Sig: Take 1 capsule by mouth 2 times daily       Last Office Visit:   4/16/2019      Last filled:  4/16/19    Next Visit Date:  No future appointments.

## 2020-06-16 ENCOUNTER — TELEPHONE (OUTPATIENT)
Dept: FAMILY MEDICINE CLINIC | Age: 60
End: 2020-06-16

## 2020-09-11 ENCOUNTER — TELEPHONE (OUTPATIENT)
Dept: UROLOGY | Age: 60
End: 2020-09-11

## 2020-09-11 DIAGNOSIS — R39.14 BENIGN PROSTATIC HYPERPLASIA WITH INCOMPLETE BLADDER EMPTYING: ICD-10-CM

## 2020-09-11 DIAGNOSIS — N40.1 BENIGN PROSTATIC HYPERPLASIA WITH INCOMPLETE BLADDER EMPTYING: ICD-10-CM

## 2020-09-11 LAB — PROSTATE SPECIFIC ANTIGEN: 11.11 NG/ML (ref 0–5.4)

## 2020-09-14 ENCOUNTER — OFFICE VISIT (OUTPATIENT)
Dept: UROLOGY | Age: 60
End: 2020-09-14
Payer: COMMERCIAL

## 2020-09-14 VITALS
DIASTOLIC BLOOD PRESSURE: 80 MMHG | SYSTOLIC BLOOD PRESSURE: 124 MMHG | HEART RATE: 79 BPM | HEIGHT: 74 IN | BODY MASS INDEX: 28.88 KG/M2 | WEIGHT: 225 LBS

## 2020-09-14 PROCEDURE — 99214 OFFICE O/P EST MOD 30 MIN: CPT | Performed by: UROLOGY

## 2020-09-14 RX ORDER — CIPROFLOXACIN 500 MG/1
500 TABLET, FILM COATED ORAL 2 TIMES DAILY
Qty: 6 TABLET | Refills: 0 | Status: SHIPPED | OUTPATIENT
Start: 2020-09-14 | End: 2020-10-15 | Stop reason: ALTCHOICE

## 2020-09-14 RX ORDER — M-VIT,TX,IRON,MINS/CALC/FOLIC 27MG-0.4MG
1 TABLET ORAL DAILY
COMMUNITY

## 2020-09-14 NOTE — PROGRESS NOTES
Sexual Activity    Alcohol use: No    Drug use: Yes     Frequency: 4.0 times per week     Types: Marijuana    Sexual activity: None   Lifestyle    Physical activity     Days per week: None     Minutes per session: None    Stress: None   Relationships    Social connections     Talks on phone: None     Gets together: None     Attends Hoahaoism service: None     Active member of club or organization: None     Attends meetings of clubs or organizations: None     Relationship status: None    Intimate partner violence     Fear of current or ex partner: None     Emotionally abused: None     Physically abused: None     Forced sexual activity: None   Other Topics Concern    None   Social History Narrative    None     Family History   Problem Relation Age of Onset    Breast Cancer Mother     Cancer Brother         thyroid     Current Outpatient Medications   Medication Sig Dispense Refill    Saw Palmetto, Serenoa repens, (SAW PALMETTO PO) Take by mouth      Omega-3 Fatty Acids (FISH OIL PO) Take by mouth      VITAMIN E PO Take by mouth      NONFORMULARY Prostate health      Multiple Vitamins-Minerals (THERAPEUTIC MULTIVITAMIN-MINERALS) tablet Take 1 tablet by mouth daily      Hyaluronic Acid-Vitamin C (HYALURONIC ACID PO) Take by mouth      ciprofloxacin (CIPRO) 500 MG tablet Take 1 tablet by mouth 2 times daily Start the day before procedure 10/6/20 6 tablet 0    tamsulosin (FLOMAX) 0.4 MG capsule Take 1 capsule by mouth 2 times daily (Patient not taking: Reported on 9/14/2020) 180 capsule 1     No current facility-administered medications for this visit. Patient has no known allergies. All reviewed and verified by Dr Fernando Méndez on today's visit    PSA   Date Value Ref Range Status   09/11/2020 11.11 (H) 0.00 - 5.40 ng/mL Final   05/23/2019 11.68 (H) 0.00 - 5.40 ng/mL Final     No results found for this visit on 09/14/20.     Physical Exam  Vitals:    09/14/20 1337   BP: 124/80   Pulse: 79   Weight: 225 lb (102.1 kg)   Height: 6' 1.5\" (1.867 m)     Constitutional: patient is oriented to person, place, and time. patient appears well-developed. Not in distress. Ears: Adequate hearing/no hearing loss  Head: Normocephalic. Atraumatic  Neck: Normal range of motion. Cardiovascular: Normal rate, BP reviewed. Normal  Pulmonary/Chest: Normal respiratory effort Normal  Abdominal: Not distended. Normal  Urologic Exam  Unchanged. .. Musculoskeletal: Normal range of motion. Ambulatory. Extremities: No edema  Neurological: Intact   Skin: Skin is warm and dry. No lesions. No rashes   Psychiatric: Normal affect. Assessment/Medical Necessity-Decision Making  Continued elevated PSA of 11  Intermittent cath etiology unknown  Plan  Cystoscopy  Transrectal ultrasound-guided prostate biopsies  Preoperative instructions given  Greater than 50% of 40 minutes spent consulting patient face-to-face  No orders of the defined types were placed in this encounter. Orders Placed This Encounter   Medications    ciprofloxacin (CIPRO) 500 MG tablet     Sig: Take 1 tablet by mouth 2 times daily Start the day before procedure 10/6/20     Dispense:  6 tablet     Refill:  0     Skye Lopes MD       Please note this report has been partially produced using speech recognition software  And may cause contain errors related to that system including grammar, punctuation and spelling as well as words and phrases that may seem inappropriate. If there are questions or concerns please feel free to contact me to clarify.

## 2020-10-01 ENCOUNTER — NURSE ONLY (OUTPATIENT)
Dept: PRIMARY CARE CLINIC | Age: 60
End: 2020-10-01

## 2020-10-01 ENCOUNTER — HOSPITAL ENCOUNTER (OUTPATIENT)
Dept: PREADMISSION TESTING | Age: 60
Discharge: HOME | End: 2020-10-05

## 2020-10-01 VITALS
HEART RATE: 76 BPM | DIASTOLIC BLOOD PRESSURE: 90 MMHG | SYSTOLIC BLOOD PRESSURE: 155 MMHG | OXYGEN SATURATION: 98 % | BODY MASS INDEX: 30.56 KG/M2 | RESPIRATION RATE: 16 BRPM | WEIGHT: 230.6 LBS | HEIGHT: 73 IN | TEMPERATURE: 98.5 F

## 2020-10-01 LAB
ABO/RH: NORMAL
ANTIBODY SCREEN: NORMAL
EKG ATRIAL RATE: 68 BPM
EKG P AXIS: 55 DEGREES
EKG P-R INTERVAL: 208 MS
EKG Q-T INTERVAL: 436 MS
EKG QRS DURATION: 158 MS
EKG QTC CALCULATION (BAZETT): 463 MS
EKG R AXIS: -83 DEGREES
EKG T AXIS: 28 DEGREES
EKG VENTRICULAR RATE: 68 BPM
HCT VFR BLD CALC: 41.5 % (ref 42–52)
HEMOGLOBIN: 13.4 G/DL (ref 14–18)
MCH RBC QN AUTO: 27.3 PG (ref 27–31.3)
MCHC RBC AUTO-ENTMCNC: 32.4 % (ref 33–37)
MCV RBC AUTO: 84.2 FL (ref 80–100)
PDW BLD-RTO: 13.9 % (ref 11.5–14.5)
PLATELET # BLD: 299 K/UL (ref 130–400)
RBC # BLD: 4.93 M/UL (ref 4.7–6.1)
WBC # BLD: 6.9 K/UL (ref 4.8–10.8)

## 2020-10-01 PROCEDURE — U0003 INFECTIOUS AGENT DETECTION BY NUCLEIC ACID (DNA OR RNA); SEVERE ACUTE RESPIRATORY SYNDROME CORONAVIRUS 2 (SARS-COV-2) (CORONAVIRUS DISEASE [COVID-19]), AMPLIFIED PROBE TECHNIQUE, MAKING USE OF HIGH THROUGHPUT TECHNOLOGIES AS DESCRIBED BY CMS-2020-01-R: HCPCS

## 2020-10-01 PROCEDURE — 86901 BLOOD TYPING SEROLOGIC RH(D): CPT

## 2020-10-01 PROCEDURE — 86850 RBC ANTIBODY SCREEN: CPT

## 2020-10-01 PROCEDURE — 86900 BLOOD TYPING SEROLOGIC ABO: CPT

## 2020-10-01 PROCEDURE — 93005 ELECTROCARDIOGRAM TRACING: CPT | Performed by: UROLOGY

## 2020-10-01 PROCEDURE — 85027 COMPLETE CBC AUTOMATED: CPT

## 2020-10-01 RX ORDER — SODIUM CHLORIDE 0.9 % (FLUSH) 0.9 %
10 SYRINGE (ML) INJECTION PRN
Status: CANCELLED | OUTPATIENT
Start: 2020-10-07

## 2020-10-01 RX ORDER — SODIUM CHLORIDE 0.9 % (FLUSH) 0.9 %
10 SYRINGE (ML) INJECTION EVERY 12 HOURS SCHEDULED
Status: CANCELLED | OUTPATIENT
Start: 2020-10-07

## 2020-10-01 RX ORDER — LIDOCAINE HYDROCHLORIDE 10 MG/ML
1 INJECTION, SOLUTION EPIDURAL; INFILTRATION; INTRACAUDAL; PERINEURAL
Status: CANCELLED | OUTPATIENT
Start: 2020-10-07 | End: 2020-10-07

## 2020-10-01 RX ORDER — SODIUM CHLORIDE, SODIUM LACTATE, POTASSIUM CHLORIDE, CALCIUM CHLORIDE 600; 310; 30; 20 MG/100ML; MG/100ML; MG/100ML; MG/100ML
INJECTION, SOLUTION INTRAVENOUS CONTINUOUS
Status: CANCELLED | OUTPATIENT
Start: 2020-10-07

## 2020-10-01 NOTE — PROGRESS NOTES
Yellow PAT instructions reviewed with patient, who verbalizes understanding. Sent for COVID test after PAT, patient expresses that he may not self Quarantine until [employer].

## 2020-10-03 LAB
SARS-COV-2: NOT DETECTED
SOURCE: NORMAL

## 2020-10-03 PROCEDURE — 93010 ELECTROCARDIOGRAM REPORT: CPT | Performed by: INTERNAL MEDICINE

## 2020-10-06 ENCOUNTER — ANESTHESIA EVENT (OUTPATIENT)
Dept: OPERATING ROOM | Age: 60
End: 2020-10-06

## 2020-10-06 NOTE — ANESTHESIA PRE PROCEDURE
(Patient not taking: Reported on 9/14/2020) 180 capsule 1       Allergies:  No Known Allergies    Problem List:    Patient Active Problem List   Diagnosis Code    Frequency of urination R35.0    Benign prostatic hyperplasia with lower urinary tract symptoms N40.1    Gross hematuria R31.0    Recurrent UTI N39.0       Past Medical History:  No past medical history on file. Past Surgical History:        Procedure Laterality Date    HERNIA REPAIR      age 9    TONSILLECTOMY AND ADENOIDECTOMY      age 8       Social History:    Social History     Tobacco Use    Smoking status: Former Smoker     Packs/day: 1.00     Years: 30.00     Pack years: 30.00     Types: Cigarettes     Last attempt to quit: 10/7/2010     Years since quitting: 10.0    Smokeless tobacco: Never Used   Substance Use Topics    Alcohol use: Yes     Comment: social                                 Counseling given: Not Answered      Vital Signs (Current): There were no vitals filed for this visit. BP Readings from Last 3 Encounters:   10/01/20 (!) 155/90   09/14/20 124/80   12/05/19 (!) 150/90       NPO Status:                                                                                 BMI:   Wt Readings from Last 3 Encounters:   10/01/20 230 lb 9.6 oz (104.6 kg)   09/14/20 225 lb (102.1 kg)   12/05/19 234 lb (106.1 kg)     There is no height or weight on file to calculate BMI.    CBC:   Lab Results   Component Value Date    WBC 6.9 10/01/2020    RBC 4.93 10/01/2020    HGB 13.4 10/01/2020    HCT 41.5 10/01/2020    MCV 84.2 10/01/2020    RDW 13.9 10/01/2020     10/01/2020       CMP: No results found for: NA, K, CL, CO2, BUN, CREATININE, GFRAA, AGRATIO, LABGLOM, GLUCOSE, PROT, CALCIUM, BILITOT, ALKPHOS, AST, ALT    POC Tests: No results for input(s): POCGLU, POCNA, POCK, POCCL, POCBUN, POCHEMO, POCHCT in the last 72 hours.     Coags: No results found for: PROTIME, INR, APTT    HCG (If

## 2020-10-07 ENCOUNTER — APPOINTMENT (OUTPATIENT)
Dept: ULTRASOUND IMAGING | Age: 60
End: 2020-10-07
Attending: UROLOGY

## 2020-10-07 ENCOUNTER — HOSPITAL ENCOUNTER (OUTPATIENT)
Age: 60
Setting detail: OUTPATIENT SURGERY
Discharge: HOME | End: 2020-10-07
Attending: UROLOGY | Admitting: UROLOGY

## 2020-10-07 ENCOUNTER — ANESTHESIA (OUTPATIENT)
Dept: OPERATING ROOM | Age: 60
End: 2020-10-07

## 2020-10-07 VITALS
DIASTOLIC BLOOD PRESSURE: 100 MMHG | TEMPERATURE: 97.8 F | HEART RATE: 66 BPM | OXYGEN SATURATION: 95 % | SYSTOLIC BLOOD PRESSURE: 169 MMHG | RESPIRATION RATE: 20 BRPM

## 2020-10-07 VITALS — DIASTOLIC BLOOD PRESSURE: 90 MMHG | OXYGEN SATURATION: 99 % | SYSTOLIC BLOOD PRESSURE: 146 MMHG

## 2020-10-07 PROCEDURE — 55700 PR BIOPSY OF PROSTATE,NEEDLE/PUNCH: CPT | Performed by: UROLOGY

## 2020-10-07 PROCEDURE — 2709999900 US GUIDED NEEDLE PLACEMENT

## 2020-10-07 PROCEDURE — 7100000000 HC PACU RECOVERY - FIRST 15 MIN: Performed by: UROLOGY

## 2020-10-07 PROCEDURE — 3700000001 HC ADD 15 MINUTES (ANESTHESIA): Performed by: UROLOGY

## 2020-10-07 PROCEDURE — 3700000000 HC ANESTHESIA ATTENDED CARE: Performed by: UROLOGY

## 2020-10-07 PROCEDURE — 2709999900 HC NON-CHARGEABLE SUPPLY: Performed by: UROLOGY

## 2020-10-07 PROCEDURE — 88305 TISSUE EXAM BY PATHOLOGIST: CPT

## 2020-10-07 PROCEDURE — 2580000003 HC RX 258: Performed by: NURSE PRACTITIONER

## 2020-10-07 PROCEDURE — 3600000004 HC SURGERY LEVEL 4 BASE: Performed by: UROLOGY

## 2020-10-07 PROCEDURE — 6370000000 HC RX 637 (ALT 250 FOR IP): Performed by: UROLOGY

## 2020-10-07 PROCEDURE — 76872 US TRANSRECTAL: CPT | Performed by: UROLOGY

## 2020-10-07 PROCEDURE — 7100000001 HC PACU RECOVERY - ADDTL 15 MIN: Performed by: UROLOGY

## 2020-10-07 PROCEDURE — 3600000014 HC SURGERY LEVEL 4 ADDTL 15MIN: Performed by: UROLOGY

## 2020-10-07 PROCEDURE — 52000 CYSTOURETHROSCOPY: CPT | Performed by: UROLOGY

## 2020-10-07 PROCEDURE — 7100000010 HC PHASE II RECOVERY - FIRST 15 MIN: Performed by: UROLOGY

## 2020-10-07 PROCEDURE — 2580000003 HC RX 258: Performed by: UROLOGY

## 2020-10-07 PROCEDURE — 2580000003 HC RX 258: Performed by: NURSE ANESTHETIST, CERTIFIED REGISTERED

## 2020-10-07 PROCEDURE — 6360000002 HC RX W HCPCS: Performed by: NURSE ANESTHETIST, CERTIFIED REGISTERED

## 2020-10-07 RX ORDER — PROPOFOL 10 MG/ML
INJECTION, EMULSION INTRAVENOUS PRN
Status: DISCONTINUED | OUTPATIENT
Start: 2020-10-07 | End: 2020-10-07 | Stop reason: SDUPTHER

## 2020-10-07 RX ORDER — LABETALOL HYDROCHLORIDE 5 MG/ML
5 INJECTION, SOLUTION INTRAVENOUS EVERY 10 MIN PRN
Status: DISCONTINUED | OUTPATIENT
Start: 2020-10-07 | End: 2020-10-07 | Stop reason: HOSPADM

## 2020-10-07 RX ORDER — ONDANSETRON 2 MG/ML
4 INJECTION INTRAMUSCULAR; INTRAVENOUS
Status: DISCONTINUED | OUTPATIENT
Start: 2020-10-07 | End: 2020-10-07 | Stop reason: HOSPADM

## 2020-10-07 RX ORDER — PROPOFOL 10 MG/ML
INJECTION, EMULSION INTRAVENOUS CONTINUOUS PRN
Status: DISCONTINUED | OUTPATIENT
Start: 2020-10-07 | End: 2020-10-07 | Stop reason: SDUPTHER

## 2020-10-07 RX ORDER — MAGNESIUM HYDROXIDE 1200 MG/15ML
LIQUID ORAL PRN
Status: DISCONTINUED | OUTPATIENT
Start: 2020-10-07 | End: 2020-10-07 | Stop reason: ALTCHOICE

## 2020-10-07 RX ORDER — SODIUM CHLORIDE 0.9 % (FLUSH) 0.9 %
10 SYRINGE (ML) INJECTION PRN
Status: DISCONTINUED | OUTPATIENT
Start: 2020-10-07 | End: 2020-10-07 | Stop reason: HOSPADM

## 2020-10-07 RX ORDER — MORPHINE SULFATE 2 MG/ML
1 INJECTION, SOLUTION INTRAMUSCULAR; INTRAVENOUS EVERY 5 MIN PRN
Status: DISCONTINUED | OUTPATIENT
Start: 2020-10-07 | End: 2020-10-07 | Stop reason: HOSPADM

## 2020-10-07 RX ORDER — MIDAZOLAM HYDROCHLORIDE 1 MG/ML
INJECTION INTRAMUSCULAR; INTRAVENOUS PRN
Status: DISCONTINUED | OUTPATIENT
Start: 2020-10-07 | End: 2020-10-07 | Stop reason: SDUPTHER

## 2020-10-07 RX ORDER — SODIUM CHLORIDE, SODIUM LACTATE, POTASSIUM CHLORIDE, CALCIUM CHLORIDE 600; 310; 30; 20 MG/100ML; MG/100ML; MG/100ML; MG/100ML
INJECTION, SOLUTION INTRAVENOUS CONTINUOUS
Status: DISCONTINUED | OUTPATIENT
Start: 2020-10-07 | End: 2020-10-07 | Stop reason: HOSPADM

## 2020-10-07 RX ORDER — METOCLOPRAMIDE HYDROCHLORIDE 5 MG/ML
10 INJECTION INTRAMUSCULAR; INTRAVENOUS
Status: DISCONTINUED | OUTPATIENT
Start: 2020-10-07 | End: 2020-10-07 | Stop reason: HOSPADM

## 2020-10-07 RX ORDER — HYDROCODONE BITARTRATE AND ACETAMINOPHEN 5; 325 MG/1; MG/1
2 TABLET ORAL PRN
Status: DISCONTINUED | OUTPATIENT
Start: 2020-10-07 | End: 2020-10-07 | Stop reason: HOSPADM

## 2020-10-07 RX ORDER — HYDROCODONE BITARTRATE AND ACETAMINOPHEN 5; 325 MG/1; MG/1
1 TABLET ORAL PRN
Status: DISCONTINUED | OUTPATIENT
Start: 2020-10-07 | End: 2020-10-07 | Stop reason: HOSPADM

## 2020-10-07 RX ORDER — GENTAMICIN SULFATE 40 MG/ML
INJECTION, SOLUTION INTRAMUSCULAR; INTRAVENOUS PRN
Status: DISCONTINUED | OUTPATIENT
Start: 2020-10-07 | End: 2020-10-07 | Stop reason: SDUPTHER

## 2020-10-07 RX ORDER — SODIUM CHLORIDE 0.9 % (FLUSH) 0.9 %
10 SYRINGE (ML) INJECTION EVERY 12 HOURS SCHEDULED
Status: DISCONTINUED | OUTPATIENT
Start: 2020-10-07 | End: 2020-10-07 | Stop reason: HOSPADM

## 2020-10-07 RX ORDER — SODIUM CHLORIDE, SODIUM LACTATE, POTASSIUM CHLORIDE, CALCIUM CHLORIDE 600; 310; 30; 20 MG/100ML; MG/100ML; MG/100ML; MG/100ML
INJECTION, SOLUTION INTRAVENOUS CONTINUOUS PRN
Status: DISCONTINUED | OUTPATIENT
Start: 2020-10-07 | End: 2020-10-07 | Stop reason: SDUPTHER

## 2020-10-07 RX ORDER — MEPERIDINE HYDROCHLORIDE 25 MG/ML
12.5 INJECTION INTRAMUSCULAR; INTRAVENOUS; SUBCUTANEOUS EVERY 5 MIN PRN
Status: DISCONTINUED | OUTPATIENT
Start: 2020-10-07 | End: 2020-10-07 | Stop reason: HOSPADM

## 2020-10-07 RX ORDER — 0.9 % SODIUM CHLORIDE 0.9 %
500 INTRAVENOUS SOLUTION INTRAVENOUS
Status: DISCONTINUED | OUTPATIENT
Start: 2020-10-07 | End: 2020-10-07 | Stop reason: HOSPADM

## 2020-10-07 RX ORDER — FENTANYL CITRATE 50 UG/ML
25 INJECTION, SOLUTION INTRAMUSCULAR; INTRAVENOUS EVERY 5 MIN PRN
Status: DISCONTINUED | OUTPATIENT
Start: 2020-10-07 | End: 2020-10-07 | Stop reason: HOSPADM

## 2020-10-07 RX ORDER — LIDOCAINE HYDROCHLORIDE 10 MG/ML
1 INJECTION, SOLUTION EPIDURAL; INFILTRATION; INTRACAUDAL; PERINEURAL
Status: DISCONTINUED | OUTPATIENT
Start: 2020-10-07 | End: 2020-10-07 | Stop reason: HOSPADM

## 2020-10-07 RX ORDER — LIDOCAINE HYDROCHLORIDE 20 MG/ML
JELLY TOPICAL PRN
Status: DISCONTINUED | OUTPATIENT
Start: 2020-10-07 | End: 2020-10-07 | Stop reason: ALTCHOICE

## 2020-10-07 RX ORDER — DIPHENHYDRAMINE HYDROCHLORIDE 50 MG/ML
12.5 INJECTION INTRAMUSCULAR; INTRAVENOUS
Status: DISCONTINUED | OUTPATIENT
Start: 2020-10-07 | End: 2020-10-07 | Stop reason: HOSPADM

## 2020-10-07 RX ORDER — FENTANYL CITRATE 50 UG/ML
INJECTION, SOLUTION INTRAMUSCULAR; INTRAVENOUS PRN
Status: DISCONTINUED | OUTPATIENT
Start: 2020-10-07 | End: 2020-10-07 | Stop reason: SDUPTHER

## 2020-10-07 RX ADMIN — FENTANYL CITRATE 50 MCG: 50 INJECTION, SOLUTION INTRAMUSCULAR; INTRAVENOUS at 07:35

## 2020-10-07 RX ADMIN — PROPOFOL 30 MG: 10 INJECTION, EMULSION INTRAVENOUS at 07:45

## 2020-10-07 RX ADMIN — SODIUM CHLORIDE, POTASSIUM CHLORIDE, SODIUM LACTATE AND CALCIUM CHLORIDE: 600; 310; 30; 20 INJECTION, SOLUTION INTRAVENOUS at 07:35

## 2020-10-07 RX ADMIN — PROPOFOL 150 MCG/KG/MIN: 10 INJECTION, EMULSION INTRAVENOUS at 07:40

## 2020-10-07 RX ADMIN — PROPOFOL 30 MG: 10 INJECTION, EMULSION INTRAVENOUS at 07:50

## 2020-10-07 RX ADMIN — GENTAMICIN SULFATE 200 MG: 40 INJECTION, SOLUTION INTRAMUSCULAR; INTRAVENOUS at 07:37

## 2020-10-07 RX ADMIN — PROPOFOL 30 MG: 10 INJECTION, EMULSION INTRAVENOUS at 07:39

## 2020-10-07 RX ADMIN — FENTANYL CITRATE 25 MCG: 50 INJECTION, SOLUTION INTRAMUSCULAR; INTRAVENOUS at 07:50

## 2020-10-07 RX ADMIN — MIDAZOLAM HYDROCHLORIDE 2 MG: 2 INJECTION, SOLUTION INTRAMUSCULAR; INTRAVENOUS at 07:35

## 2020-10-07 RX ADMIN — FENTANYL CITRATE 25 MCG: 50 INJECTION, SOLUTION INTRAMUSCULAR; INTRAVENOUS at 07:45

## 2020-10-07 RX ADMIN — SODIUM CHLORIDE, POTASSIUM CHLORIDE, SODIUM LACTATE AND CALCIUM CHLORIDE: 600; 310; 30; 20 INJECTION, SOLUTION INTRAVENOUS at 07:22

## 2020-10-07 ASSESSMENT — PULMONARY FUNCTION TESTS
PIF_VALUE: 1
PIF_VALUE: 0
PIF_VALUE: 1
PIF_VALUE: 0
PIF_VALUE: 1
PIF_VALUE: 0
PIF_VALUE: 1
PIF_VALUE: 0
PIF_VALUE: 1
PIF_VALUE: 1
PIF_VALUE: 2
PIF_VALUE: 0
PIF_VALUE: 0
PIF_VALUE: 1
PIF_VALUE: 0

## 2020-10-07 ASSESSMENT — PAIN - FUNCTIONAL ASSESSMENT: PAIN_FUNCTIONAL_ASSESSMENT: 0-10

## 2020-10-07 ASSESSMENT — PAIN SCALES - GENERAL
PAINLEVEL_OUTOF10: 0
PAINLEVEL_OUTOF10: 0

## 2020-10-07 NOTE — PROGRESS NOTES
Dr. Benítez Govern in to update pt. All questions answered.   Dr. Tyson Barros aware of BP pt to follow up with PMD

## 2020-10-07 NOTE — PROGRESS NOTES
9602  Admitted to [facility] from [address] [place] [address] for recovery. Report received from OR staff. Awake and talking. [id number]  Brother Sukhdeep Plunkett called for ride. [id number]  Report called to short stay.

## 2020-10-07 NOTE — PROGRESS NOTES
DC instructions given to pt. Voices understanding. Also stressed the importance to f/u with PMD re BP.   Pt agrees and voices understanding

## 2020-10-07 NOTE — OP NOTE
[address]                      [address] [place], [address] [facility]                                OPERATIVE REPORT    PATIENT NAME: Virgilio Reardon                    :        1960  MED REC NO:   [id number]                            ROOM:  ACCOUNT NO:   [de-identified]                           ADMIT DATE: 10/07/2020  PROVIDER:     Yahaira Franklin MD    DATE OF PROCEDURE:  10/07/2020    PREOPERATIVE DIAGNOSES:  Elevated PSA of 11.11, history of urinary  retention, hypotonic bladder. POSTOPERATIVE DIAGNOSES:  Elevated PSA of 11.11, history of urinary  retention, hypotonic bladder. OPERATION:  Transrectal ultrasound-guided prostate biopsies, flexible  cystoscopy. SURGEON:  Yahaira Franklin MD    ANESTHESIA:  General.    ESTIMATED BLOOD LOSS:  Not applicable. INDICATIONS:  The patient is a 65-year-old male with history of  continued elevated PSA above 11. The patient has history of a hypotonic  bladder, is on chronic intermittent self-catheterization four times a  day. The patient will undergo cystoscopy due to history of intermittent  hematuria and also will undergo prostate biopsies. The patient  understands risks and benefits and wants to proceed. OPERATIVE NOTE:  The patient received preoperative ciprofloxacin p.o. He also received gentamicin 200 mg IV preoperatively along with a  Fleet's Enema. The patient underwent ultrasound of the prostate. Volume was calculated at 120 mL. Then under ultrasound guidance, 12  biopsies were obtained. These were labeled right lateral base, right  lateral mid base, right lateral apex, right base, right mid base, right  apex, left lateral base, left lateral mid base, left lateral apex, left  base, left mid base, left apex. The patient tolerated procedure well. There were no complications. Then, the patient underwent a flexible  cystoscopy. Penile urethra was within normal limits.   Prostatic urethra  showed a very large median bar completely obstructing the bladder  outlet. Examination of bladder showed no evidence of tumors, stones,  and/or other abnormalities other than very floppy bladder with minimal  bladder tone. The patient had a very large bladder capacity. The  patient has a hypotonic bladder picture. At this time, the bladder was  drained and the patient was discharged to recovery room in stable  condition. The patient tolerated the procedure well. There were no  complications.         Arcenio Brush MD    D: 10/07/2020 13:34:43       T: 10/07/2020 13:41:00     [email or web address]  Job#: [id number]     Doc#: [id number]    CC:

## 2020-10-07 NOTE — BRIEF OP NOTE
Brief Postoperative Note      Patient: Landen Jensen  YOB: 1960  MRN: [id number]    Date of Procedure: 10/7/2020    Pre-Op Diagnosis: ELEVATED PSA/Neuropathic bladder    Post-Op Diagnosis: Same       Procedure(s):  TRUS BX  FLEXIBLE CYSTOSCOPY    Surgeon(s):  Caleb Ricci MD    Assistant:  * No surgical staff found *    Anesthesia: Monitor Anesthesia Care    Estimated Blood Loss (mL): Minimal    Complications: None    Specimens:   ID Type Source Tests Collected by Time Destination   A : A. RT BASE PROSTATE Tissue Prostate SURGICAL PATHOLOGY Caleb Ricci MD 10/7/2020 0709    B : B. RT BASE LAT PROSTATE Tissue Prostate SURGICAL PATHOLOGY Caleb Ricci MD 10/7/2020 0709    C : [id number] [facility] Tissue Prostate SURGICAL PATHOLOGY Caleb Ricci MD 10/7/2020 0709    D : D. RT MID LAT PROSTATE Tissue Prostate SURGICAL PATHOLOGY Caleb Ricci MD 10/7/2020 9543    E : Will Goodpasture Tissue Prostate SURGICAL PATHOLOGY Caleb Ricci MD 10/7/2020 9048    F : F. RT APEX LAT PROSTATE Tissue Prostate SURGICAL PATHOLOGY Caleb Ricci MD 10/7/2020 1024    G : Judeen Precise LT BASE PROSTATE Tissue Prostate SURGICAL PATHOLOGY Caleb Ricci MD 10/7/2020 0709    H : H. LT BASE LAT PROSTATE Tissue Prostate SURGICAL PATHOLOGY Caleb Ricci MD 10/7/2020 0709    I : [id number] [facility] Tissue Prostate SURGICAL PATHOLOGY Caleb Ricci MD 10/7/2020 4112    J : J. LT MID LAT PROSTATE Tissue Prostate SURGICAL PATHOLOGY Caleb Ricci MD 10/7/2020 4908    K : [address]23 [facility] Tissue Prostate SURGICAL PATHOLOGY Caleb Ricci MD 10/7/2020 0709    L : L.  LEFT  APEX LAT PROSTATE Tissue Prostate SURGICAL PATHOLOGY Caleb Ricci MD 10/7/2020 5357        Implants:  * No implants in log *      Drains: * No LDAs found *    Findings: 120 gr prostate/hypotonic bladder/no bladder malignacy  Electronically signed by Caleb Ricci MD on 10/7/2020 at 8:02 AM

## 2020-10-07 NOTE — PROGRESS NOTES
D/c instructions given with understanding. No rx's. Pt. Instructed to f/u with pmd in regards to elevated bp. Pt. Anastasia Fire understanding.

## 2020-10-15 ENCOUNTER — VIRTUAL VISIT (OUTPATIENT)
Dept: UROLOGY | Age: 60
End: 2020-10-15

## 2020-10-15 PROCEDURE — 99441 PR PHYS/QHP TELEPHONE EVALUATION 5-10 MIN: CPT | Performed by: UROLOGY

## 2020-10-15 NOTE — PROGRESS NOTES
Urology  Virtual postop visit over telephone  Patient at home Dr Dana Miller in the office  Patient underwent prostate biopsy a week ago  Biopsies showed chronic inflammation no evidence of malignancy    [address]                      [facility], [address] [place] [address]                                 OPERATIVE REPORT     PATIENT NAME: Ifeoma Ace                    :        1960  MED REC NO:   [id number]                            ROOM:  ACCOUNT NO:   [de-identified]                           ADMIT DATE: 10/07/2020  PROVIDER:     Albert Aragon MD     DATE OF PROCEDURE:  10/07/2020     PREOPERATIVE DIAGNOSES:  Elevated PSA of 11.11, history of urinary  retention, hypotonic bladder.     POSTOPERATIVE DIAGNOSES:  Elevated PSA of 11.11, history of urinary  retention, hypotonic bladder.     OPERATION:  Transrectal ultrasound-guided prostate biopsies, flexible  cystoscopy.     SURGEON:  Albert Aragon MD     ANESTHESIA:  General.     ESTIMATED BLOOD LOSS:  Not applicable.     INDICATIONS:  The patient is a 69-year-old male with history of  continued elevated PSA above 11. The patient has history of a hypotonic  bladder, is on chronic intermittent self-catheterization four times a  day. The patient will undergo cystoscopy due to history of intermittent  hematuria and also will undergo prostate biopsies. The patient  understands risks and benefits and wants to proceed.     OPERATIVE NOTE:  The patient received preoperative ciprofloxacin p.o. He also received gentamicin 200 mg IV preoperatively along with a  Fleet's Enema. The patient underwent ultrasound of the prostate. Volume was calculated at 120 mL. Then under ultrasound guidance, 12  biopsies were obtained. These were labeled right lateral base, right  lateral mid base, right lateral apex, right base, right mid base, right  apex, left lateral base, left lateral mid base, left lateral apex, left  base, left mid base, left apex.   The patient tolerated procedure well. There were no complications. Then, the patient underwent a flexible  cystoscopy. Penile urethra was within normal limits. Prostatic urethra  showed a very large median bar completely obstructing the bladder  outlet. Examination of bladder showed no evidence of tumors, stones,  and/or other abnormalities other than very floppy bladder with minimal  bladder tone. The patient had a very large bladder capacity. The  patient has a hypotonic bladder picture. At this time, the bladder was  drained and the patient was discharged to recovery room in stable  condition. The patient tolerated the procedure well.   There were no  complications.           Manny Valenzuela MD         Performed by: [id number]                                        [address] [facility] [address]   [place] [address]                                        [phone]   FINAL SURGICAL PATHOLOGY REPORT   Patient Name: Lolis Hall             Accession No:  [id number]    Age Sex:   1960                   Location:      [facility]   Account No:   [de-identified]                  Collected:     10/07/2020   Med Rec No:    [id number]                   Received:      10/07/2020   Attend Phys:   HIWOT SOTO                Completed:     10/09/2020   Perform Phys: Oc Norton           FINAL DIAGNOSIS:   AMerian Gearing BIOPSY RIGHT BASE PROSTATE:   PROSTATIC PARENCHYMA, NO EVIDENCE OF CARCINOMA     B.  CORE BIOPSY RIGHT BASE LATERAL PROSTATE:   PROSTATIC PARENCHYMA, NO EVIDENCE OF CARCINOMA     C.  CORE BIOPSY RIGHT MEDIAL PROSTATE:   PROSTATIC PARENCHYMA, NO EVIDENCE OF CARCINOMA     D.  CORE BIOPSY RIGHT MID LATERAL PROSTATE:   PROSTATIC PARENCHYMA WITH CHRONIC INFLAMMATION, NO EVIDENCE OF CARCINOMA     E.  CORE BIOPSY RIGHT APEX PROSTATE:   PROSTATIC PARENCHYMA WITH CHRONIC INFLAMMATION, NO EVIDENCE OF CARCINOMA     F.  CORE BIOPSY RIGHT APEX LATERAL PROSTATE:   PROSTATIC PARENCHYMA, NO EVIDENCE OF CARCINOMA     G. CORE BIOPSY LEFT BASE PROSTATE:   PROSTATIC PARENCHYMA WITH CHRONIC INFLAMMATION, NO EVIDENCE OF CARCINOMA     H. CORE BIOPSY LEFT BASE LATERAL PROSTATE:   PROSTATIC PARENCHYMA, NO EVIDENCE OF CARCINOMA     I.CORE BIOPSY LEFT MEDIAL PROSTATE:   PROSTATIC PARENCHYMA, NO EVIDENCE OF CARCINOMA     J.  CORE BIOPSY LEFT MID LATERAL PROSTATE:   PROSTATIC PARENCHYMA, NO EVIDENCE OF CARCINOMA     K. CORE BIOPSY LEFT APEX PROSTATE:   PROSTATIC PARENCHYMA WITH CHRONIC INFLAMMATION, NO EVIDENCE OF CARCINOMA     L. CORE BIOPSY LEFT APEX LATERAL PROSTATE:   PROSTATIC PARENCHYMA WITH CHRONIC INFLAMMATION, NO EVIDENCE OF CARCINOMA              JACMO/JACMO              Prostate volume was 120 g  Patient continues to do self-catheterization  Cystoscopy showed a hypotonic bladder  Unlikely to urinate on his own even if prostate is removed therefore recommend continued intermittent cath  We will discuss finasteride to shrink the prostate down  PSA 6 months with follow-up  Greater than 5 less than 10-minute virtual visit over telephone  Multiple questions answered  Madeleine Zayas MD

## 2021-04-14 ENCOUNTER — HOSPITAL ENCOUNTER (OUTPATIENT)
Dept: LAB | Age: 61
Discharge: HOME | End: 2021-04-14

## 2021-04-14 DIAGNOSIS — R97.20 ELEVATED PSA: ICD-10-CM

## 2021-04-14 LAB — PROSTATE SPECIFIC ANTIGEN: 10.81 NG/ML (ref 0–5.4)

## 2021-04-14 PROCEDURE — 84153 ASSAY OF PSA TOTAL: CPT

## 2021-04-14 PROCEDURE — 36415 COLL VENOUS BLD VENIPUNCTURE: CPT

## 2021-04-15 ENCOUNTER — VIRTUAL VISIT (OUTPATIENT)
Dept: UROLOGY | Age: 61
End: 2021-04-15

## 2021-04-15 DIAGNOSIS — R97.20 ELEVATED PSA: Primary | ICD-10-CM

## 2021-04-15 PROCEDURE — 99441 PR PHYS/QHP TELEPHONE EVALUATION 5-10 MIN: CPT | Performed by: UROLOGY

## 2021-04-15 NOTE — PROGRESS NOTES
Urology  Virtual visit with telephone secondary to COVID-19 pandemic  History of elevated PSA 11.11 which prompted a prostate biopsy which showed chronic inflammation no evidence of ligaments he  PSA 6 months later is lower at 10.81  Patient continues to do intermittent catheterization 3 times a day  No issues with catheterizing himself  Patient will have a PSA 1 year follow-up 1 year for an exam  Greater than 5 less than 10-minute virtual visit over telephone  Yuly Hernandez MD

## 2022-01-19 ENCOUNTER — NURSE ONLY (OUTPATIENT)
Dept: UROLOGY | Age: 62
End: 2022-01-19

## 2022-01-19 ENCOUNTER — TELEPHONE (OUTPATIENT)
Dept: UROLOGY | Age: 62
End: 2022-01-19

## 2022-01-19 DIAGNOSIS — R31.0 GROSS HEMATURIA: ICD-10-CM

## 2022-01-19 DIAGNOSIS — R31.0 GROSS HEMATURIA: Primary | ICD-10-CM

## 2022-01-19 NOTE — TELEPHONE ENCOUNTER
Pt drop a urine off for dark brown blood that started yesterday. Today it is gross hematuria will not test. Pt does self caths.  Culture is sent on it

## 2022-01-20 ENCOUNTER — TELEPHONE (OUTPATIENT)
Dept: UROLOGY | Age: 62
End: 2022-01-20

## 2022-01-20 RX ORDER — SULFAMETHOXAZOLE AND TRIMETHOPRIM 800; 160 MG/1; MG/1
1 TABLET ORAL 2 TIMES DAILY
Qty: 14 TABLET | Refills: 0 | Status: SHIPPED | OUTPATIENT
Start: 2022-01-20 | End: 2022-01-27

## 2022-01-20 NOTE — TELEPHONE ENCOUNTER
Pt dropped off a urine sample yesterday and was asking for the results. Pt was advised that it wasn't finalized yet and that we would call him once it was completed. Pt then asked if he should be worried about the amount of blood in his urine? He would like a call back.  Ph: [phone]

## 2022-01-20 NOTE — RESULT ENCOUNTER NOTE
Patient's urine culture is showing infection  I called in Bactrim to [address]  Please let the patient know

## 2022-01-21 LAB
ORGANISM: ABNORMAL
URINE CULTURE, ROUTINE: ABNORMAL

## 2022-01-21 RX ORDER — CIPROFLOXACIN 500 MG/1
500 TABLET, FILM COATED ORAL 2 TIMES DAILY
Qty: 14 TABLET | Refills: 0 | Status: SHIPPED | OUTPATIENT
Start: 2022-01-21 | End: 2022-04-15 | Stop reason: ALTCHOICE

## 2022-04-14 DIAGNOSIS — R97.20 ELEVATED PSA: ICD-10-CM

## 2022-04-14 LAB — PROSTATE SPECIFIC ANTIGEN: 14.33 NG/ML (ref 0–4)

## 2022-04-15 ENCOUNTER — OFFICE VISIT (OUTPATIENT)
Dept: UROLOGY | Age: 62
End: 2022-04-15

## 2022-04-15 VITALS
BODY MASS INDEX: 30.48 KG/M2 | WEIGHT: 230 LBS | HEIGHT: 73 IN | SYSTOLIC BLOOD PRESSURE: 128 MMHG | OXYGEN SATURATION: 96 % | HEART RATE: 73 BPM | DIASTOLIC BLOOD PRESSURE: 84 MMHG

## 2022-04-15 DIAGNOSIS — R97.20 ELEVATED PSA: Primary | ICD-10-CM

## 2022-04-15 PROCEDURE — 99213 OFFICE O/P EST LOW 20 MIN: CPT | Performed by: UROLOGY

## 2022-04-15 RX ORDER — VITAMIN B COMPLEX
1 CAPSULE ORAL DAILY
COMMUNITY

## 2022-04-15 NOTE — PROGRESS NOTES
[facility] EVALUATION NOTE                                                 H&P                                                                                                                                                 Reason for Visit  Elevated PSA    History of Present Illness  Status post prostate biopsies for elevated PSA  Neurogenic bladder on chronic intermittent cath  Patient had prostate biopsies done in 2021 at a PSA greater than 11 which showed chronic inflammation  Current PSA at 14      Urologic Review of Systems/Symptoms  Unchanged    Review of Systems  Hospitalization: None recent  All 14 categories of Review of Systems otherwise reviewed no other findings reported. Unchanged  History reviewed. No pertinent past medical history.   Past Surgical History:   Procedure Laterality Date    CYSTOSCOPY N/A 10/7/2020    FLEXIBLE CYSTOSCOPY performed by Arleen Jara MD at [facility]      age 10   [facility] TONSILLECTOMY AND ADENOIDECTOMY      age 9    ULTRASOUND PROSTATE/TRANSRECTAL Bilateral 10/7/2020    TRUS BX performed by Arleen Jara MD at [address] [place] [address] History     Socioeconomic History    Marital status: Single     Spouse name: None    Number of children: None    Years of education: None    Highest education level: None   Occupational History    None   Tobacco Use    Smoking status: Former Smoker     Packs/day: 1.00     Years: 30.00     Pack years: 30.00     Types: Cigarettes     Quit date: 10/7/2010     Years since quittin.5    Smokeless tobacco: Never Used   Vaping Use    Vaping Use: Never used   Substance and Sexual Activity    Alcohol use: Yes     Comment: social     Drug use: Yes     Frequency: 4.0 times per week     Types: Marijuana Fadumolafelisa Cardenas)    Sexual activity: None   Other Topics Concern    None   Social History Narrative    None     Social Determinants of Health     Financial Resource Strain:     Difficulty of Paying Living Expenses: Not on file   Food Insecurity:     Worried About [facility] in the Last Year: Not on file    Kimberley of Food in the Last Year: Not on file   Transportation Needs:     Lack of Transportation (Medical): Not on file    Lack of Transportation (Non-Medical): Not on file   Physical Activity:     Days of Exercise per Week: Not on file    Minutes of Exercise per Session: Not on file   Stress:     Feeling of Stress : Not on file   Social Connections:     Frequency of Communication with Friends and Family: Not on file    Frequency of Social Gatherings with Friends and Family: Not on file    Attends Denominational Services: Not on file    Active Member of [address] [place] [address] or Organizations: Not on file    Attends Club or Organization Meetings: Not on file    Marital Status: Not on file   Intimate Partner Violence:     Fear of Current or Ex-Partner: Not on file    Emotionally Abused: Not on file    Physically Abused: Not on file    Sexually Abused: Not on file   Housing Stability:     Unable to Pay for Housing in the Last Year: Not on file    Number of [place] in the Last Year: Not on file    Unstable Housing in the Last Year: Not on file     Family History   Problem Relation Age of Onset    Breast Cancer Mother     Cancer Brother         thyroid     Current Outpatient Medications   Medication Sig Dispense Refill    b complex vitamins capsule Take 1 capsule by mouth daily      Saw Palmetto, Serenoa repens, (SAW PALMETTO PO) Take by mouth      Omega-3 Fatty Acids (FISH OIL PO) Take by mouth      VITAMIN E PO Take by mouth      Multiple Vitamins-Minerals (THERAPEUTIC MULTIVITAMIN-MINERALS) tablet Take 1 tablet by mouth daily       No current facility-administered medications for this visit. Patient has no known allergies.   All reviewed and verified by Dr Freddie Mccullough on today's visit    PSA   Date Value Ref Range Status   04/14/2022 14.33 (H) 0.00 - 4.00 ng/mL Final   04/14/2021 10.81 (H) 0.00 - 5.40 ng/mL Final   09/11/2020 11.11 (H) 0.00 - 5.40 ng/mL Final   05/23/2019 11.68 (H) 0.00 - 5.40 ng/mL Final     No results found for this visit on 04/15/22. Physical Exam  Vitals:    04/15/22 1209   BP: 128/84   Pulse: 73   SpO2: 96%   Weight: 230 lb (104.3 kg)   Height: 6' 1\" (1.854 m)     Constitutional: Not in distress  Physical exam otherwise unchanged. Assessment/Medical Necessity-Decision Making  Elevated PSA status post prostate biopsy October 2021 at a PSA greater than 11  Prostate biopsy showed inflammation  Continues intermittent self caths for neurogenic bladder  PSA up slightly at 14  No plans on repeating a biopsy  Plan  PSA 6 months notify patient result  PSA 1 year with follow-up  Greater than 50% of 15 minutes spent consulting patient face-to-face  Orders Placed This Encounter   Procedures    PSA, Diagnostic     Standing Status:   Future     Standing Expiration Date:   4/15/2023    PSA, Diagnostic     Standing Status:   Future     Standing Expiration Date:   4/15/2023     No orders of the defined types were placed in this encounter. Be Walker MD       Please note this report has been partially produced using speech recognition software  And may cause contain errors related to that system including grammar, punctuation and spelling as well as words and phrases that may seem inappropriate. If there are questions or concerns please feel free to contact me to clarify.

## 2022-07-26 ENCOUNTER — NURSE ONLY (OUTPATIENT)
Dept: UROLOGY | Age: 62
End: 2022-07-26

## 2022-07-26 ENCOUNTER — TELEPHONE (OUTPATIENT)
Dept: UROLOGY | Age: 62
End: 2022-07-26

## 2022-07-26 DIAGNOSIS — R82.998 DARK BROWN URINE: Primary | ICD-10-CM

## 2022-07-26 LAB
BILIRUBIN, POC: ABNORMAL
BLOOD URINE, POC: ABNORMAL
CLARITY, POC: CLEAR
COLOR, POC: YELLOW
GLUCOSE URINE, POC: ABNORMAL
KETONES, POC: ABNORMAL
LEUKOCYTE EST, POC: ABNORMAL
NITRITE, POC: ABNORMAL
PH, POC: 6
PROTEIN, POC: ABNORMAL
SPECIFIC GRAVITY, POC: 1.02
UROBILINOGEN, POC: 0.2

## 2022-07-26 PROCEDURE — 81003 URINALYSIS AUTO W/O SCOPE: CPT | Performed by: UROLOGY

## 2022-07-26 NOTE — TELEPHONE ENCOUNTER
Pt dropped off urine sample for dark brown (root beer) colored urine for a couple of days.    Pt does self-cath  NKDA    Component 7/26/22 1104    Color, UA yellow    Clarity, UA clear    Glucose, UA POC neg    Bilirubin, UA neg    Ketones, UA neg    Spec Grav, UA 1.020    Blood, UA POC trace-intact    pH, UA 6.0    Protein, UA POC neg    Urobilinogen, UA 0.2    Leukocytes, UA neg    Nitrite, UA neg               Specimen Collected: 07/26/22 11:04 EDT Last Resulted: 07/26/22 11:04 EDT

## 2024-02-16 ENCOUNTER — NURSE ONLY (OUTPATIENT)
Dept: UROLOGY | Age: 64
End: 2024-02-16

## 2024-02-16 ENCOUNTER — TELEPHONE (OUTPATIENT)
Dept: UROLOGY | Age: 64
End: 2024-02-16

## 2024-02-16 DIAGNOSIS — N30.90 CYSTITIS: Primary | ICD-10-CM

## 2024-02-16 DIAGNOSIS — R31.9 URINARY TRACT INFECTION WITH HEMATURIA, SITE UNSPECIFIED: Primary | ICD-10-CM

## 2024-02-16 DIAGNOSIS — N39.0 RECURRENT UTI: Primary | ICD-10-CM

## 2024-02-16 DIAGNOSIS — N39.0 URINARY TRACT INFECTION WITH HEMATURIA, SITE UNSPECIFIED: Primary | ICD-10-CM

## 2024-02-16 DIAGNOSIS — N39.0 RECURRENT UTI: ICD-10-CM

## 2024-02-16 LAB
BILIRUBIN, POC: ABNORMAL
BLOOD URINE, POC: ABNORMAL
CLARITY, POC: CLEAR
COLOR, POC: YELLOW
GLUCOSE URINE, POC: ABNORMAL
KETONES, POC: ABNORMAL
LEUKOCYTE EST, POC: ABNORMAL
NITRITE, POC: POSITIVE
PH, POC: 7
PROTEIN, POC: ABNORMAL
SPECIFIC GRAVITY, POC: 1.02
UROBILINOGEN, POC: 0.2

## 2024-02-16 PROCEDURE — 81003 URINALYSIS AUTO W/O SCOPE: CPT | Performed by: PHYSICIAN ASSISTANT

## 2024-02-16 RX ORDER — SULFAMETHOXAZOLE AND TRIMETHOPRIM 800; 160 MG/1; MG/1
1 TABLET ORAL 2 TIMES DAILY
Qty: 20 TABLET | Refills: 0 | Status: SHIPPED | OUTPATIENT
Start: 2024-02-16 | End: 2024-02-26

## 2024-02-16 RX ORDER — CIPROFLOXACIN 500 MG/1
500 TABLET, FILM COATED ORAL 2 TIMES DAILY
Qty: 20 TABLET | Refills: 0 | Status: SHIPPED | OUTPATIENT
Start: 2024-02-16 | End: 2024-02-26

## 2024-02-16 NOTE — TELEPHONE ENCOUNTER
Pt drop a urine off it showed  Color, UA yellow   Clarity, UA clear   Glucose, UA POC neg   Bilirubin, UA neg   Ketones, UA neg   Spec Grav, UA 1.020   Blood, UA POC trace   pH, UA 7.0   Protein, UA POC neg   Urobilinogen, UA 0.2   Leukocytes, UA small   Nitrite, UA positive

## 2024-02-16 NOTE — TELEPHONE ENCOUNTER
Former [facility] pt. Pt has cloudy, bad odor, and discolored urine for the past few weeks. Pt took an OTC test and it was positive for a UTI. He is asking if he can come to our office, or a different [facility] office to give a urine sample for verification? He'll be establishing with [facility] in April.   Per verbal drop a urine off

## 2024-02-17 LAB — BACTERIA UR CULT: NORMAL

## 2024-02-19 ENCOUNTER — TELEPHONE (OUTPATIENT)
Dept: UROLOGY | Age: 64
End: 2024-02-19

## 2024-02-19 DIAGNOSIS — R31.9 URINARY TRACT INFECTION WITH HEMATURIA, SITE UNSPECIFIED: Primary | ICD-10-CM

## 2024-02-19 DIAGNOSIS — R31.9 URINARY TRACT INFECTION WITH HEMATURIA, SITE UNSPECIFIED: ICD-10-CM

## 2024-02-19 DIAGNOSIS — N39.0 URINARY TRACT INFECTION WITH HEMATURIA, SITE UNSPECIFIED: Primary | ICD-10-CM

## 2024-02-19 DIAGNOSIS — N39.0 URINARY TRACT INFECTION WITH HEMATURIA, SITE UNSPECIFIED: ICD-10-CM

## 2024-02-20 LAB — BACTERIA UR CULT: NORMAL

## 2024-04-16 DIAGNOSIS — R97.20 ELEVATED PSA: Primary | ICD-10-CM

## 2024-06-11 ENCOUNTER — HOSPITAL ENCOUNTER (OUTPATIENT)
Dept: RADIOLOGY | Facility: HOSPITAL | Age: 64
Discharge: HOME | End: 2024-06-11
Payer: MEDICARE

## 2024-06-11 DIAGNOSIS — N40.0 BENIGN PROSTATIC HYPERPLASIA WITHOUT LOWER URINARY TRACT SYMPTOMS: ICD-10-CM

## 2024-06-11 DIAGNOSIS — R33.8 OTHER RETENTION OF URINE: ICD-10-CM

## 2024-06-11 DIAGNOSIS — R39.198 OTHER DIFFICULTIES WITH MICTURITION: ICD-10-CM

## 2024-06-11 DIAGNOSIS — R97.20 ELEVATED PROSTATE SPECIFIC ANTIGEN (PSA): ICD-10-CM

## 2024-06-11 DIAGNOSIS — N40.1 BENIGN PROSTATIC HYPERPLASIA WITH LOWER URINARY TRACT SYMPTOMS: ICD-10-CM

## 2024-06-11 DIAGNOSIS — Z78.9 OTHER SPECIFIED HEALTH STATUS: ICD-10-CM

## 2024-06-11 DIAGNOSIS — N39.0 URINARY TRACT INFECTION, SITE NOT SPECIFIED: ICD-10-CM

## 2024-06-11 DIAGNOSIS — N13.8 OTHER OBSTRUCTIVE AND REFLUX UROPATHY: ICD-10-CM

## 2024-06-11 PROCEDURE — A9575 INJ GADOTERATE MEGLUMI 0.1ML: HCPCS | Performed by: UROLOGY

## 2024-06-11 PROCEDURE — 72197 MRI PELVIS W/O & W/DYE: CPT

## 2024-06-11 PROCEDURE — 2550000001 HC RX 255 CONTRASTS: Performed by: UROLOGY

## 2024-06-11 RX ORDER — GADOTERATE MEGLUMINE 376.9 MG/ML
0.2 INJECTION INTRAVENOUS
Status: COMPLETED | OUTPATIENT
Start: 2024-06-11 | End: 2024-06-11

## 2024-08-19 ENCOUNTER — ANESTHESIA EVENT (OUTPATIENT)
Dept: OPERATING ROOM | Facility: HOSPITAL | Age: 64
End: 2024-08-19
Payer: MEDICARE

## 2024-08-19 ENCOUNTER — PREP FOR PROCEDURE (OUTPATIENT)
Dept: OPERATING ROOM | Facility: HOSPITAL | Age: 64
End: 2024-08-19

## 2024-08-19 ENCOUNTER — ANESTHESIA (OUTPATIENT)
Dept: OPERATING ROOM | Facility: HOSPITAL | Age: 64
End: 2024-08-19
Payer: MEDICARE

## 2024-08-19 ENCOUNTER — APPOINTMENT (OUTPATIENT)
Dept: RADIOLOGY | Facility: HOSPITAL | Age: 64
End: 2024-08-19
Payer: MEDICARE

## 2024-08-19 ENCOUNTER — HOSPITAL ENCOUNTER (INPATIENT)
Facility: HOSPITAL | Age: 64
LOS: 3 days | Discharge: HOME HEALTH | End: 2024-08-23
Attending: EMERGENCY MEDICINE | Admitting: SURGERY
Payer: MEDICARE

## 2024-08-19 DIAGNOSIS — R31.0 GROSS HEMATURIA: ICD-10-CM

## 2024-08-19 DIAGNOSIS — R31.9 HEMATURIA, UNSPECIFIED TYPE: Primary | ICD-10-CM

## 2024-08-19 DIAGNOSIS — N40.0 BENIGN PROSTATIC HYPERPLASIA WITHOUT LOWER URINARY TRACT SYMPTOMS: ICD-10-CM

## 2024-08-19 DIAGNOSIS — N31.2 ATONIC NEUROGENIC BLADDER: ICD-10-CM

## 2024-08-19 DIAGNOSIS — N40.1 BENIGN PROSTATIC HYPERPLASIA WITH URINARY RETENTION: ICD-10-CM

## 2024-08-19 DIAGNOSIS — R33.8 BENIGN PROSTATIC HYPERPLASIA WITH URINARY RETENTION: ICD-10-CM

## 2024-08-19 LAB
ALBUMIN SERPL BCP-MCNC: 4.2 G/DL (ref 3.4–5)
ALP SERPL-CCNC: 45 U/L (ref 33–136)
ALT SERPL W P-5'-P-CCNC: 4 U/L (ref 10–52)
ANION GAP SERPL CALC-SCNC: 11 MMOL/L (ref 10–20)
APTT PPP: 31 SECONDS (ref 27–38)
AST SERPL W P-5'-P-CCNC: 14 U/L (ref 9–39)
BASOPHILS # BLD AUTO: 0.03 X10*3/UL (ref 0–0.1)
BASOPHILS NFR BLD AUTO: 0.2 %
BILIRUB SERPL-MCNC: 0.5 MG/DL (ref 0–1.2)
BUN SERPL-MCNC: 14 MG/DL (ref 6–23)
CALCIUM SERPL-MCNC: 9.2 MG/DL (ref 8.6–10.3)
CHLORIDE SERPL-SCNC: 106 MMOL/L (ref 98–107)
CO2 SERPL-SCNC: 25 MMOL/L (ref 21–32)
CREAT SERPL-MCNC: 0.85 MG/DL (ref 0.5–1.3)
EGFRCR SERPLBLD CKD-EPI 2021: >90 ML/MIN/1.73M*2
EOSINOPHIL # BLD AUTO: 0.01 X10*3/UL (ref 0–0.7)
EOSINOPHIL NFR BLD AUTO: 0.1 %
ERYTHROCYTE [DISTWIDTH] IN BLOOD BY AUTOMATED COUNT: 14.1 % (ref 11.5–14.5)
GLUCOSE SERPL-MCNC: 98 MG/DL (ref 74–99)
HCT VFR BLD AUTO: 41.7 % (ref 41–52)
HGB BLD-MCNC: 13.9 G/DL (ref 13.5–17.5)
HOLD SPECIMEN: NORMAL
IMM GRANULOCYTES # BLD AUTO: 0.03 X10*3/UL (ref 0–0.7)
IMM GRANULOCYTES NFR BLD AUTO: 0.2 % (ref 0–0.9)
INR PPP: 1 (ref 0.9–1.1)
LYMPHOCYTES # BLD AUTO: 0.79 X10*3/UL (ref 1.2–4.8)
LYMPHOCYTES NFR BLD AUTO: 6.3 %
MCH RBC QN AUTO: 28.1 PG (ref 26–34)
MCHC RBC AUTO-ENTMCNC: 33.3 G/DL (ref 32–36)
MCV RBC AUTO: 84 FL (ref 80–100)
MONOCYTES # BLD AUTO: 0.55 X10*3/UL (ref 0.1–1)
MONOCYTES NFR BLD AUTO: 4.4 %
NEUTROPHILS # BLD AUTO: 11.12 X10*3/UL (ref 1.2–7.7)
NEUTROPHILS NFR BLD AUTO: 88.8 %
NRBC BLD-RTO: 0 /100 WBCS (ref 0–0)
PLATELET # BLD AUTO: 264 X10*3/UL (ref 150–450)
POTASSIUM SERPL-SCNC: 4.4 MMOL/L (ref 3.5–5.3)
PROT SERPL-MCNC: 7 G/DL (ref 6.4–8.2)
PROTHROMBIN TIME: 11.5 SECONDS (ref 9.8–12.8)
RBC # BLD AUTO: 4.95 X10*6/UL (ref 4.5–5.9)
SODIUM SERPL-SCNC: 138 MMOL/L (ref 136–145)
WBC # BLD AUTO: 12.5 X10*3/UL (ref 4.4–11.3)

## 2024-08-19 PROCEDURE — 3600000003 HC OR TIME - INITIAL BASE CHARGE - PROCEDURE LEVEL THREE: Performed by: UROLOGY

## 2024-08-19 PROCEDURE — 85025 COMPLETE CBC W/AUTO DIFF WBC: CPT | Performed by: EMERGENCY MEDICINE

## 2024-08-19 PROCEDURE — G0378 HOSPITAL OBSERVATION PER HR: HCPCS

## 2024-08-19 PROCEDURE — 74176 CT ABD & PELVIS W/O CONTRAST: CPT

## 2024-08-19 PROCEDURE — 0T7D8ZZ DILATION OF URETHRA, VIA NATURAL OR ARTIFICIAL OPENING ENDOSCOPIC: ICD-10-PCS | Performed by: UROLOGY

## 2024-08-19 PROCEDURE — 85730 THROMBOPLASTIN TIME PARTIAL: CPT | Performed by: EMERGENCY MEDICINE

## 2024-08-19 PROCEDURE — 2500000005 HC RX 250 GENERAL PHARMACY W/O HCPCS: Performed by: UROLOGY

## 2024-08-19 PROCEDURE — 2500000004 HC RX 250 GENERAL PHARMACY W/ HCPCS (ALT 636 FOR OP/ED): Performed by: STUDENT IN AN ORGANIZED HEALTH CARE EDUCATION/TRAINING PROGRAM

## 2024-08-19 PROCEDURE — 3700000001 HC GENERAL ANESTHESIA TIME - INITIAL BASE CHARGE: Performed by: UROLOGY

## 2024-08-19 PROCEDURE — 3700000002 HC GENERAL ANESTHESIA TIME - EACH INCREMENTAL 1 MINUTE: Performed by: UROLOGY

## 2024-08-19 PROCEDURE — 36415 COLL VENOUS BLD VENIPUNCTURE: CPT | Performed by: EMERGENCY MEDICINE

## 2024-08-19 PROCEDURE — 0TCB8ZZ EXTIRPATION OF MATTER FROM BLADDER, VIA NATURAL OR ARTIFICIAL OPENING ENDOSCOPIC: ICD-10-PCS | Performed by: UROLOGY

## 2024-08-19 PROCEDURE — 7100000002 HC RECOVERY ROOM TIME - EACH INCREMENTAL 1 MINUTE: Performed by: UROLOGY

## 2024-08-19 PROCEDURE — 2500000004 HC RX 250 GENERAL PHARMACY W/ HCPCS (ALT 636 FOR OP/ED): Performed by: EMERGENCY MEDICINE

## 2024-08-19 PROCEDURE — 85610 PROTHROMBIN TIME: CPT | Performed by: EMERGENCY MEDICINE

## 2024-08-19 PROCEDURE — 99285 EMERGENCY DEPT VISIT HI MDM: CPT | Mod: 25

## 2024-08-19 PROCEDURE — 74176 CT ABD & PELVIS W/O CONTRAST: CPT | Mod: FOREIGN READ | Performed by: RADIOLOGY

## 2024-08-19 PROCEDURE — 2500000005 HC RX 250 GENERAL PHARMACY W/O HCPCS: Performed by: STUDENT IN AN ORGANIZED HEALTH CARE EDUCATION/TRAINING PROGRAM

## 2024-08-19 PROCEDURE — 99291 CRITICAL CARE FIRST HOUR: CPT

## 2024-08-19 PROCEDURE — 99223 1ST HOSP IP/OBS HIGH 75: CPT | Performed by: HOSPITALIST

## 2024-08-19 PROCEDURE — 2500000004 HC RX 250 GENERAL PHARMACY W/ HCPCS (ALT 636 FOR OP/ED): Performed by: HOSPITALIST

## 2024-08-19 PROCEDURE — 84075 ASSAY ALKALINE PHOSPHATASE: CPT | Performed by: EMERGENCY MEDICINE

## 2024-08-19 PROCEDURE — 7100000001 HC RECOVERY ROOM TIME - INITIAL BASE CHARGE: Performed by: UROLOGY

## 2024-08-19 PROCEDURE — 2500000005 HC RX 250 GENERAL PHARMACY W/O HCPCS

## 2024-08-19 PROCEDURE — 3600000008 HC OR TIME - EACH INCREMENTAL 1 MINUTE - PROCEDURE LEVEL THREE: Performed by: UROLOGY

## 2024-08-19 PROCEDURE — 2500000005 HC RX 250 GENERAL PHARMACY W/O HCPCS: Performed by: EMERGENCY MEDICINE

## 2024-08-19 PROCEDURE — 96365 THER/PROPH/DIAG IV INF INIT: CPT

## 2024-08-19 RX ORDER — GLYCINE 1.5 G/100ML
IRRIGANT IRRIGATION AS NEEDED
Status: DISCONTINUED | OUTPATIENT
Start: 2024-08-19 | End: 2024-08-19 | Stop reason: HOSPADM

## 2024-08-19 RX ORDER — SODIUM CHLORIDE, SODIUM LACTATE, POTASSIUM CHLORIDE, CALCIUM CHLORIDE 600; 310; 30; 20 MG/100ML; MG/100ML; MG/100ML; MG/100ML
100 INJECTION, SOLUTION INTRAVENOUS CONTINUOUS
Status: DISCONTINUED | OUTPATIENT
Start: 2024-08-19 | End: 2024-08-19 | Stop reason: HOSPADM

## 2024-08-19 RX ORDER — ACETAMINOPHEN 325 MG/1
650 TABLET ORAL EVERY 6 HOURS PRN
Status: DISCONTINUED | OUTPATIENT
Start: 2024-08-19 | End: 2024-08-23 | Stop reason: HOSPADM

## 2024-08-19 RX ORDER — GLYCINE 1.5 G/100ML
3000 IRRIGANT IRRIGATION CONTINUOUS
Status: DISCONTINUED | OUTPATIENT
Start: 2024-08-19 | End: 2024-08-23 | Stop reason: HOSPADM

## 2024-08-19 RX ORDER — OXYCODONE HYDROCHLORIDE 5 MG/1
5 TABLET ORAL EVERY 4 HOURS PRN
Status: DISCONTINUED | OUTPATIENT
Start: 2024-08-19 | End: 2024-08-23 | Stop reason: HOSPADM

## 2024-08-19 RX ORDER — LIDOCAINE HYDROCHLORIDE 20 MG/ML
1 JELLY TOPICAL ONCE
Status: DISCONTINUED | OUTPATIENT
Start: 2024-08-19 | End: 2024-08-19

## 2024-08-19 RX ORDER — SUCCINYLCHOLINE CHLORIDE 100 MG/5ML
SYRINGE (ML) INTRAVENOUS AS NEEDED
Status: DISCONTINUED | OUTPATIENT
Start: 2024-08-19 | End: 2024-08-19

## 2024-08-19 RX ORDER — ACETAMINOPHEN 160 MG/5ML
650 SOLUTION ORAL EVERY 4 HOURS PRN
Status: DISCONTINUED | OUTPATIENT
Start: 2024-08-19 | End: 2024-08-19

## 2024-08-19 RX ORDER — ONDANSETRON HYDROCHLORIDE 2 MG/ML
4 INJECTION, SOLUTION INTRAVENOUS ONCE AS NEEDED
Status: DISCONTINUED | OUTPATIENT
Start: 2024-08-19 | End: 2024-08-19 | Stop reason: HOSPADM

## 2024-08-19 RX ORDER — ONDANSETRON 4 MG/1
4 TABLET, FILM COATED ORAL EVERY 8 HOURS PRN
Status: DISCONTINUED | OUTPATIENT
Start: 2024-08-19 | End: 2024-08-23 | Stop reason: HOSPADM

## 2024-08-19 RX ORDER — FENTANYL CITRATE 50 UG/ML
25 INJECTION, SOLUTION INTRAMUSCULAR; INTRAVENOUS EVERY 5 MIN PRN
Status: DISCONTINUED | OUTPATIENT
Start: 2024-08-19 | End: 2024-08-19 | Stop reason: HOSPADM

## 2024-08-19 RX ORDER — ACETAMINOPHEN 650 MG/1
650 SUPPOSITORY RECTAL EVERY 4 HOURS PRN
Status: DISCONTINUED | OUTPATIENT
Start: 2024-08-19 | End: 2024-08-19

## 2024-08-19 RX ORDER — SODIUM CHLORIDE, SODIUM LACTATE, POTASSIUM CHLORIDE, CALCIUM CHLORIDE 600; 310; 30; 20 MG/100ML; MG/100ML; MG/100ML; MG/100ML
INJECTION, SOLUTION INTRAVENOUS CONTINUOUS PRN
Status: DISCONTINUED | OUTPATIENT
Start: 2024-08-19 | End: 2024-08-19

## 2024-08-19 RX ORDER — CEFTRIAXONE 1 G/50ML
1 INJECTION, SOLUTION INTRAVENOUS EVERY 24 HOURS
Status: DISCONTINUED | OUTPATIENT
Start: 2024-08-20 | End: 2024-08-19

## 2024-08-19 RX ORDER — ONDANSETRON HYDROCHLORIDE 2 MG/ML
4 INJECTION, SOLUTION INTRAVENOUS EVERY 8 HOURS PRN
Status: DISCONTINUED | OUTPATIENT
Start: 2024-08-19 | End: 2024-08-23 | Stop reason: HOSPADM

## 2024-08-19 RX ORDER — LIDOCAINE HYDROCHLORIDE 20 MG/ML
1 JELLY TOPICAL ONCE
Status: COMPLETED | OUTPATIENT
Start: 2024-08-19 | End: 2024-08-19

## 2024-08-19 RX ORDER — ACETAMINOPHEN 325 MG/1
650 TABLET ORAL EVERY 4 HOURS PRN
Status: DISCONTINUED | OUTPATIENT
Start: 2024-08-19 | End: 2024-08-19

## 2024-08-19 RX ORDER — CEFTRIAXONE 1 G/50ML
1 INJECTION, SOLUTION INTRAVENOUS EVERY 24 HOURS
Status: DISCONTINUED | OUTPATIENT
Start: 2024-08-19 | End: 2024-08-19

## 2024-08-19 RX ORDER — POLYETHYLENE GLYCOL 3350 17 G/17G
17 POWDER, FOR SOLUTION ORAL DAILY PRN
Status: DISCONTINUED | OUTPATIENT
Start: 2024-08-19 | End: 2024-08-23 | Stop reason: HOSPADM

## 2024-08-19 RX ORDER — LIDOCAINE HYDROCHLORIDE 20 MG/ML
INJECTION, SOLUTION INFILTRATION; PERINEURAL AS NEEDED
Status: DISCONTINUED | OUTPATIENT
Start: 2024-08-19 | End: 2024-08-19

## 2024-08-19 RX ORDER — WATER 1 ML/ML
IRRIGANT IRRIGATION AS NEEDED
Status: DISCONTINUED | OUTPATIENT
Start: 2024-08-19 | End: 2024-08-19 | Stop reason: HOSPADM

## 2024-08-19 RX ORDER — OXYCODONE HYDROCHLORIDE 5 MG/1
5 TABLET ORAL EVERY 6 HOURS PRN
Status: DISCONTINUED | OUTPATIENT
Start: 2024-08-19 | End: 2024-08-23 | Stop reason: HOSPADM

## 2024-08-19 RX ORDER — HYDRALAZINE HYDROCHLORIDE 20 MG/ML
10 INJECTION INTRAMUSCULAR; INTRAVENOUS EVERY 6 HOURS PRN
Status: DISCONTINUED | OUTPATIENT
Start: 2024-08-19 | End: 2024-08-19

## 2024-08-19 RX ORDER — PROPOFOL 10 MG/ML
INJECTION, EMULSION INTRAVENOUS AS NEEDED
Status: DISCONTINUED | OUTPATIENT
Start: 2024-08-19 | End: 2024-08-19

## 2024-08-19 RX ORDER — CEFTRIAXONE 1 G/50ML
1 INJECTION, SOLUTION INTRAVENOUS ONCE
Status: COMPLETED | OUTPATIENT
Start: 2024-08-19 | End: 2024-08-19

## 2024-08-19 RX ORDER — LABETALOL HYDROCHLORIDE 5 MG/ML
5 INJECTION, SOLUTION INTRAVENOUS ONCE AS NEEDED
Status: DISCONTINUED | OUTPATIENT
Start: 2024-08-19 | End: 2024-08-19 | Stop reason: HOSPADM

## 2024-08-19 RX ORDER — ROCURONIUM BROMIDE 10 MG/ML
INJECTION, SOLUTION INTRAVENOUS AS NEEDED
Status: DISCONTINUED | OUTPATIENT
Start: 2024-08-19 | End: 2024-08-19

## 2024-08-19 RX ORDER — CEFTRIAXONE 1 G/50ML
1 INJECTION, SOLUTION INTRAVENOUS EVERY 24 HOURS
Status: DISCONTINUED | OUTPATIENT
Start: 2024-08-20 | End: 2024-08-23

## 2024-08-19 RX ORDER — FENTANYL CITRATE 50 UG/ML
INJECTION, SOLUTION INTRAMUSCULAR; INTRAVENOUS AS NEEDED
Status: DISCONTINUED | OUTPATIENT
Start: 2024-08-19 | End: 2024-08-19

## 2024-08-19 SDOH — SOCIAL STABILITY: SOCIAL INSECURITY: WERE YOU ABLE TO COMPLETE ALL THE BEHAVIORAL HEALTH SCREENINGS?: YES

## 2024-08-19 SDOH — SOCIAL STABILITY: SOCIAL INSECURITY: DO YOU FEEL UNSAFE GOING BACK TO THE PLACE WHERE YOU ARE LIVING?: NO

## 2024-08-19 SDOH — SOCIAL STABILITY: SOCIAL INSECURITY: HAS ANYONE EVER THREATENED TO HURT YOUR FAMILY OR YOUR PETS?: NO

## 2024-08-19 SDOH — HEALTH STABILITY: MENTAL HEALTH: CURRENT SMOKER: 0

## 2024-08-19 SDOH — SOCIAL STABILITY: SOCIAL INSECURITY: DO YOU FEEL ANYONE HAS EXPLOITED OR TAKEN ADVANTAGE OF YOU FINANCIALLY OR OF YOUR PERSONAL PROPERTY?: NO

## 2024-08-19 SDOH — SOCIAL STABILITY: SOCIAL INSECURITY: DOES ANYONE TRY TO KEEP YOU FROM HAVING/CONTACTING OTHER FRIENDS OR DOING THINGS OUTSIDE YOUR HOME?: NO

## 2024-08-19 SDOH — SOCIAL STABILITY: SOCIAL INSECURITY: HAVE YOU HAD THOUGHTS OF HARMING ANYONE ELSE?: NO

## 2024-08-19 SDOH — SOCIAL STABILITY: SOCIAL INSECURITY: ABUSE: ADULT

## 2024-08-19 SDOH — SOCIAL STABILITY: SOCIAL INSECURITY: HAVE YOU HAD ANY THOUGHTS OF HARMING ANYONE ELSE?: NO

## 2024-08-19 SDOH — SOCIAL STABILITY: SOCIAL INSECURITY: ARE THERE ANY APPARENT SIGNS OF INJURIES/BEHAVIORS THAT COULD BE RELATED TO ABUSE/NEGLECT?: NO

## 2024-08-19 SDOH — SOCIAL STABILITY: SOCIAL INSECURITY: ARE YOU OR HAVE YOU BEEN THREATENED OR ABUSED PHYSICALLY, EMOTIONALLY, OR SEXUALLY BY ANYONE?: NO

## 2024-08-19 ASSESSMENT — ACTIVITIES OF DAILY LIVING (ADL)
HEARING - LEFT EAR: FUNCTIONAL
PATIENT'S MEMORY ADEQUATE TO SAFELY COMPLETE DAILY ACTIVITIES?: YES
WALKS IN HOME: INDEPENDENT
TOILETING: INDEPENDENT
LACK_OF_TRANSPORTATION: NO
ADEQUATE_TO_COMPLETE_ADL: YES
FEEDING YOURSELF: INDEPENDENT
DRESSING YOURSELF: INDEPENDENT
GROOMING: INDEPENDENT
HEARING - RIGHT EAR: FUNCTIONAL
JUDGMENT_ADEQUATE_SAFELY_COMPLETE_DAILY_ACTIVITIES: YES
BATHING: INDEPENDENT

## 2024-08-19 ASSESSMENT — PAIN SCALES - GENERAL
PAINLEVEL_OUTOF10: 0 - NO PAIN
PAINLEVEL_OUTOF10: 6
PAINLEVEL_OUTOF10: 0 - NO PAIN
PAINLEVEL_OUTOF10: 8
PAINLEVEL_OUTOF10: 0 - NO PAIN
PAINLEVEL_OUTOF10: 3
PAINLEVEL_OUTOF10: 0 - NO PAIN
PAINLEVEL_OUTOF10: 0 - NO PAIN

## 2024-08-19 ASSESSMENT — PATIENT HEALTH QUESTIONNAIRE - PHQ9
2. FEELING DOWN, DEPRESSED OR HOPELESS: NOT AT ALL
1. LITTLE INTEREST OR PLEASURE IN DOING THINGS: NOT AT ALL
SUM OF ALL RESPONSES TO PHQ9 QUESTIONS 1 & 2: 0

## 2024-08-19 ASSESSMENT — LIFESTYLE VARIABLES
SUBSTANCE_ABUSE_PAST_12_MONTHS: NO
HOW OFTEN DO YOU HAVE 6 OR MORE DRINKS ON ONE OCCASION: NEVER
HAVE YOU EVER FELT YOU SHOULD CUT DOWN ON YOUR DRINKING: NO
AUDIT-C TOTAL SCORE: 1
AUDIT-C TOTAL SCORE: 1
HOW OFTEN DO YOU HAVE A DRINK CONTAINING ALCOHOL: MONTHLY OR LESS
HAVE PEOPLE ANNOYED YOU BY CRITICIZING YOUR DRINKING: NO
EVER FELT BAD OR GUILTY ABOUT YOUR DRINKING: NO
SKIP TO QUESTIONS 9-10: 1
HOW MANY STANDARD DRINKS CONTAINING ALCOHOL DO YOU HAVE ON A TYPICAL DAY: 1 OR 2
EVER HAD A DRINK FIRST THING IN THE MORNING TO STEADY YOUR NERVES TO GET RID OF A HANGOVER: NO
TOTAL SCORE: 0
PRESCIPTION_ABUSE_PAST_12_MONTHS: NO

## 2024-08-19 ASSESSMENT — COGNITIVE AND FUNCTIONAL STATUS - GENERAL
DAILY ACTIVITIY SCORE: 24
MOBILITY SCORE: 24
PATIENT BASELINE BEDBOUND: NO

## 2024-08-19 ASSESSMENT — PAIN - FUNCTIONAL ASSESSMENT
PAIN_FUNCTIONAL_ASSESSMENT: 0-10

## 2024-08-19 ASSESSMENT — PAIN DESCRIPTION - LOCATION: LOCATION: OTHER (COMMENT)

## 2024-08-19 ASSESSMENT — COLUMBIA-SUICIDE SEVERITY RATING SCALE - C-SSRS
2. HAVE YOU ACTUALLY HAD ANY THOUGHTS OF KILLING YOURSELF?: NO
6. HAVE YOU EVER DONE ANYTHING, STARTED TO DO ANYTHING, OR PREPARED TO DO ANYTHING TO END YOUR LIFE?: NO
1. IN THE PAST MONTH, HAVE YOU WISHED YOU WERE DEAD OR WISHED YOU COULD GO TO SLEEP AND NOT WAKE UP?: NO

## 2024-08-19 NOTE — ED PROVIDER NOTES
HPI   Chief Complaint   Patient presents with    Blood in Urine     Pt self caths at home, pt had blood in urine and now is unable to urinate due to clots.          History provided by:  Patient  History of Present Illness:  64-year-old male who has an issue with chronic urinary retention and self caths multiple times per day shares that last night he had some urethral trauma which led to bleeding.  He got up this morning and he was only able to urinate some thick clots and has not been able to have any further drainage since.  He complains of some abdominal fullness.  No fever or chills.  No nausea or vomiting.  No back or flank pain.  He does not take any blood thinner medications.  His urologist is Dr. D'Amico.      PMFSH:   As per HPI, otherwise nurses notes reviewed in EMR.    Past Medical History:   Active Ambulatory Problems     Diagnosis Date Noted    No Active Ambulatory Problems     Resolved Ambulatory Problems     Diagnosis Date Noted    No Resolved Ambulatory Problems     No Additional Past Medical History      Past Surgical History:   Past Surgical History:   Procedure Laterality Date    COLONOSCOPY  09/19/2016    Colonoscopy    HERNIA REPAIR  07/11/2016    Inguinal Hernia Repair    TONSILLECTOMY  07/11/2016    Tonsillectomy      Family History: No family history on file.   Social History:    Social History     Socioeconomic History    Marital status: Single     Spouse name: Not on file    Number of children: Not on file    Years of education: Not on file    Highest education level: Not on file   Occupational History    Not on file   Tobacco Use    Smoking status: Not on file    Smokeless tobacco: Not on file   Substance and Sexual Activity    Alcohol use: Not on file    Drug use: Not on file    Sexual activity: Not on file   Other Topics Concern    Not on file   Social History Narrative    Not on file     Social Determinants of Health     Financial Resource Strain: Not on file   Food Insecurity: Not on  file   Transportation Needs: Not on file   Physical Activity: Not on file   Stress: Not on file   Social Connections: Not on file   Intimate Partner Violence: Not on file   Housing Stability: Not on file              Patient History   No past medical history on file.  Past Surgical History:   Procedure Laterality Date    COLONOSCOPY  09/19/2016    Colonoscopy    HERNIA REPAIR  07/11/2016    Inguinal Hernia Repair    TONSILLECTOMY  07/11/2016    Tonsillectomy     No family history on file.  Social History     Tobacco Use    Smoking status: Not on file    Smokeless tobacco: Not on file   Substance Use Topics    Alcohol use: Not on file    Drug use: Not on file       Physical Exam   ED Triage Vitals   Temperature Heart Rate Respirations BP   08/19/24 1401 08/19/24 1401 08/19/24 1401 08/19/24 1403   36.5 °C (97.7 °F) 89 18 (!) 149/102      Pulse Ox Temp Source Heart Rate Source Patient Position   08/19/24 1401 08/19/24 1401 -- 08/19/24 1401   97 % Temporal  Sitting      BP Location FiO2 (%)     08/19/24 1401 --     Right arm        Physical Exam  Physical Exam:    ED Triage Vitals   Temperature Heart Rate Respirations BP   08/19/24 1401 08/19/24 1401 08/19/24 1401 08/19/24 1403   36.5 °C (97.7 °F) 89 18 (!) 149/102      Pulse Ox Temp Source Heart Rate Source Patient Position   08/19/24 1401 08/19/24 1401 -- 08/19/24 1401   97 % Temporal  Sitting      BP Location FiO2 (%)     08/19/24 1401 --     Right arm          Constitutional: Vital signs per nursing notes.  Well developed, well nourished.  Mild acute distress.    Psychiatric: alert and oriented to person, place, and time; no abnormalities of mood or affect; memory intact  Eyes: PERRL; conjunctivae and lids normal; EOMI  Respiratory: normal respiratory effort and excursion; no rales, rhonchi, or wheezes; equal air entry  Cardiovascular: regular rate and rhythm; no murmurs, rubs or gallops; symmetric pulses; no edema; normal capillary refill; distal pulses  present  Neurological: normal speech; CN II-XII grossly intact; normal motor and sensory function; no nystagmus  GI: no masses, tenderness, rebound or guarding; no palpable, pulsatile mass; no organomegaly; no hernia; normal bowel sounds; (-) Robbins´s sign; (-) McBurney´s sign; (-) CVA tenderness; except fullness over the bladder  Lymphatic: no adenopathy of neck, groin  Musculoskeletal: normal gait and station; normal digits and nails; no gross tendon or ligament injury; normal to palpation; normal strength/tone; neurovascular status intact; (-) Rey´s sign  Skin: normal to inspection; normal to palpation; no rash  GCS: 15      ED Course & MDM   Diagnoses as of 08/19/24 1624   Hematuria, unspecified type                 No data recorded                                 Medical Decision Making  Medical Decision Making:    Differential Diagnoses Considered: Urinary retention, blood loss anemia     EKG:    Labs Reviewed   CBC WITH AUTO DIFFERENTIAL - Abnormal       Result Value    WBC 12.5 (*)     nRBC 0.0      RBC 4.95      Hemoglobin 13.9      Hematocrit 41.7      MCV 84      MCH 28.1      MCHC 33.3      RDW 14.1      Platelets 264      Neutrophils % 88.8      Immature Granulocytes %, Automated 0.2      Lymphocytes % 6.3      Monocytes % 4.4      Eosinophils % 0.1      Basophils % 0.2      Neutrophils Absolute 11.12 (*)     Immature Granulocytes Absolute, Automated 0.03      Lymphocytes Absolute 0.79 (*)     Monocytes Absolute 0.55      Eosinophils Absolute 0.01      Basophils Absolute 0.03     COMPREHENSIVE METABOLIC PANEL - Abnormal    Glucose 98      Sodium 138      Potassium 4.4      Chloride 106      Bicarbonate 25      Anion Gap 11      Urea Nitrogen 14      Creatinine 0.85      eGFR >90      Calcium 9.2      Albumin 4.2      Alkaline Phosphatase 45      Total Protein 7.0      AST 14      Bilirubin, Total 0.5      ALT 4 (*)    PROTIME-INR - Normal    Protime 11.5      INR 1.0     APTT - Normal    aPTT 31       Narrative:     The APTT is no longer used for monitoring Unfractionated Heparin Therapy. For monitoring Heparin Therapy, use the Heparin Assay.   GRAY TOP    Extra Tube Hold for add-ons.     URINALYSIS WITH REFLEX CULTURE AND MICROSCOPIC    Narrative:     The following orders were created for panel order Urinalysis with Reflex Culture and Microscopic.  Procedure                               Abnormality         Status                     ---------                               -----------         ------                     Urinalysis with Reflex C...[[id number]]                                                 Extra Urine Gray Tube[[id number]]                                                         Please view results for these tests on the individual orders.   URINALYSIS WITH REFLEX CULTURE AND MICROSCOPIC   EXTRA URINE GRAY TUBE       CT abdomen pelvis wo IV contrast   Final Result   1.  Presence of blood within urinary bladder.  An underlying mass   cannot be excluded.  Correlation with cystoscopy is recommended.   2.  Enlarged prostate gland.   3.  No evidence of hydronephrosis or nephrolithiasis.   4.  Sigmoid diverticulosis, with no evidence of diverticulitis.   Signed by Zacarias Rodriguez MD          Diagnostic testing considered:         Review of recent and relevant records:    ED Medication Administration:     Prescription Medication Consideration/Given:     Social Determinants of Health Significantly Affecting Care:      Summary:    BP      Temp      Pulse     Resp      SpO2        Abnormal Labs Reviewed   CBC WITH AUTO DIFFERENTIAL - Abnormal; Notable for the following components:       Result Value    WBC 12.5 (*)     Neutrophils Absolute 11.12 (*)     Lymphocytes Absolute 0.79 (*)     All other components within normal limits   COMPREHENSIVE METABOLIC PANEL - Abnormal; Notable for the following components:    ALT 4 (*)     All other components within normal limits       Diagnostic evaluation was completed.   Metabolic panel was unremarkable including normal renal function.  INR is 1.0.  CBC shows an elevated white blood cell count of 12.5.  There is no evidence of anemia with a hemoglobin of 13.9.  Platelets are in the normal range.  CT of the abdomen pelvis shows presence of blood within urinary bladder.  Underlying mass cannot be excluded.  Enlarged prostate gland.  No evidence of hydronephrosis or nephrolithiasis.  Sigmoid diverticulosis with no evidence of diverticulitis.    Nurses attempted to place a three-way catheter but were unsuccessful to perform irrigation.  It is suspected that they were most likely having difficulty passing through the prostate.    I have reviewed the patient's history, physical exam, and test information with the consultant,   Dr. D'Amico                , who agrees to care for the patient.  He recommends applying 2 Uro-Jet's to the urethra.  Then getting a 22-gauge coudé tip three-way catheter and inserted.  He then recommends manual irrigation with glycine.  He would then like the patient to be admitted to the hospitalist.  He would like us to start the patient on IV antibiotics and he will likely add the patient on for possible cystoscopy tomorrow.    I discussed the results and plan for hospitalization with the patient and/or family/friend if present.  Questions were addressed.  Patient and/or family/friend expressed understanding.    The patient's condition requires ongoing treatment and evaluation necessitating hospital admission.  I have reviewed the patient's history, physical exam, and test information with the admitting physician,    Dr. Garcia               , who agrees to hospitalize the patient.         Diagnoses as of 08/19/24 1624   Hematuria, unspecified type         Procedure  Procedures     Milo HAWTHORNE MD  08/19/24 1624

## 2024-08-19 NOTE — ANESTHESIA PROCEDURE NOTES
Airway  Date/Time: 8/19/2024 7:40 PM  Urgency: elective    Airway not difficult    Staffing  Performed: attending   Authorized by: Mike Treadwell MD    Performed by: Mike Treadwell MD  Patient location during procedure: OR    Indications and Patient Condition  Indications for airway management: anesthesia  Spontaneous Ventilation: absent  Sedation level: deep  Preoxygenated: yes  Patient position: sniffing  Mask difficulty assessment: 0 - not attempted  Planned trial extubation    Final Airway Details  Final airway type: endotracheal airway      Successful airway: ETT  Cuffed: yes   Successful intubation technique: video laryngoscopy  Facilitating devices/methods: intubating stylet  Endotracheal tube insertion site: oral  Blade type: Alex.  Blade size: #4  ETT size (mm): 7.5  Cormack-Lehane Classification: grade I - full view of glottis  Placement verified by: capnometry   Measured from: lips  ETT to lips (cm): 22  Number of attempts at approach: 1    Additional Comments  RSI

## 2024-08-19 NOTE — ANESTHESIA PREPROCEDURE EVALUATION
Robert Wells is a 64 y.o. male here for:    Cystoscopy with Irrigation Bladder  With Louis D'Amico, MD  Pre-Op Diagnosis Codes:      * Blood in urine [R31.0]    Relevant Problems   /Renal   (+) BPH (benign prostatic hyperplasia)      Genitourinary   (+) Atonic neurogenic bladder   (+) Hematuria       Lab Results   Component Value Date    HGB 13.9 08/19/2024    HCT 41.7 08/19/2024    WBC 12.5 (H) 08/19/2024     08/19/2024     08/19/2024    K 4.4 08/19/2024     08/19/2024    CREATININE 0.85 08/19/2024    BUN 14 08/19/2024       Social History     Tobacco Use   Smoking Status Not on file   Smokeless Tobacco Not on file       No Known Allergies    No current outpatient medications    Past Surgical History:   Procedure Laterality Date    COLONOSCOPY  09/19/2016    Colonoscopy    HERNIA REPAIR  07/11/2016    Inguinal Hernia Repair    TONSILLECTOMY  07/11/2016    Tonsillectomy       No family history on file.    NPO Details:  No data recorded  Last ate oatmeal ~1300  Physical Exam    Airway  Mallampati: II  TM distance: >3 FB     Cardiovascular    Dental    Pulmonary    Abdominal            Anesthesia Plan    History of general anesthesia?: yes  History of complications of general anesthesia?: no    ASA 2 - emergent     general     The patient is not a current smoker.    intravenous induction   Postoperative administration of opioids is intended.  Trial extubation is planned.  Anesthetic plan and risks discussed with patient.

## 2024-08-19 NOTE — H&P
History Of Present Illness  Robert Wells is a 64 y.o. male presenting with history of BPH straight cath's as needed presented to the ED with difficulty with urination and hematuria. Symptoms started today. He sees urology regularly. In the ED unable to place mckeon due to significant clotting. Discussed with urology and plan to place mckeon in OR under anesthesia.      Past Medical History: BPH    Surgical History  He has a past surgical history that includes Tonsillectomy (07/11/2016); Hernia repair (07/11/2016); and Colonoscopy (09/19/2016).     Social History: Former smoker,    Family History: breast cancer,     10 point review of systems negative except per HPI      Last Recorded Vitals  BP (!) 149/102   Pulse 89   Temp 36.5 °C (97.7 °F) (Temporal)   Resp 18   Wt 103 kg (227 lb)   SpO2 97%     Physical Exam   Gen: NAD  HEENT: EOM, MMM  CV: RRR, no murmurs rubs or gallops  Resp: coarse rhonchi b/l   Abdomen: soft, NT,+BS  LE: No edema    Relevant Results  Labs Reviewed   CBC WITH AUTO DIFFERENTIAL - Abnormal       Result Value    WBC 12.5 (*)     nRBC 0.0      RBC 4.95      Hemoglobin 13.9      Hematocrit 41.7      MCV 84      MCH 28.1      MCHC 33.3      RDW 14.1      Platelets 264      Neutrophils % 88.8      Immature Granulocytes %, Automated 0.2      Lymphocytes % 6.3      Monocytes % 4.4      Eosinophils % 0.1      Basophils % 0.2      Neutrophils Absolute 11.12 (*)     Immature Granulocytes Absolute, Automated 0.03      Lymphocytes Absolute 0.79 (*)     Monocytes Absolute 0.55      Eosinophils Absolute 0.01      Basophils Absolute 0.03     COMPREHENSIVE METABOLIC PANEL - Abnormal    Glucose 98      Sodium 138      Potassium 4.4      Chloride 106      Bicarbonate 25      Anion Gap 11      Urea Nitrogen 14      Creatinine 0.85      eGFR >90      Calcium 9.2      Albumin 4.2      Alkaline Phosphatase 45      Total Protein 7.0      AST 14      Bilirubin, Total 0.5      ALT 4 (*)    PROTIME-INR - Normal     Protime 11.5      INR 1.0     APTT - Normal    aPTT 31      Narrative:     The APTT is no longer used for monitoring Unfractionated Heparin Therapy. For monitoring Heparin Therapy, use the Heparin Assay.   GRAY TOP    Extra Tube Hold for add-ons.     URINALYSIS WITH REFLEX CULTURE AND MICROSCOPIC    Narrative:     The following orders were created for panel order Urinalysis with Reflex Culture and Microscopic.  Procedure                               Abnormality         Status                     ---------                               -----------         ------                     Urinalysis with Reflex C...[[id number]]                                                 Extra Urine Gray Tube[[id number]]                                                         Please view results for these tests on the individual orders.   URINALYSIS WITH REFLEX CULTURE AND MICROSCOPIC   EXTRA URINE GRAY TUBE     CT abdomen pelvis wo IV contrast   Final Result   1.  Presence of blood within urinary bladder.  An underlying mass   cannot be excluded.  Correlation with cystoscopy is recommended.   2.  Enlarged prostate gland.   3.  No evidence of hydronephrosis or nephrolithiasis.   4.  Sigmoid diverticulosis, with no evidence of diverticulitis.   Signed by Zacarias Rodriguez MD          Assessment/Plan  64 year old male with history of BPH admitted with acute urinary retention and hematuria    -unable to place mckeon in ED  -Urology consulted and plan to place mckeon in OR with irrigation  -empiric ceftriaxone given in ED contnue here     DVT ppx: SCD    Gopi Garcia MD

## 2024-08-20 ENCOUNTER — APPOINTMENT (OUTPATIENT)
Dept: RADIOLOGY | Facility: HOSPITAL | Age: 64
End: 2024-08-20
Payer: MEDICARE

## 2024-08-20 ENCOUNTER — APPOINTMENT (OUTPATIENT)
Dept: CARDIOLOGY | Facility: HOSPITAL | Age: 64
End: 2024-08-20
Payer: MEDICARE

## 2024-08-20 PROBLEM — R31.9 HEMATURIA, UNSPECIFIED TYPE: Status: ACTIVE | Noted: 2024-08-20

## 2024-08-20 LAB
ABO GROUP (TYPE) IN BLOOD: NORMAL
ANTIBODY SCREEN: NORMAL
APPEARANCE UR: ABNORMAL
BASOPHILS # BLD AUTO: 0.02 X10*3/UL (ref 0–0.1)
BASOPHILS NFR BLD AUTO: 0.2 %
BILIRUB UR STRIP.AUTO-MCNC: NEGATIVE MG/DL
CARDIAC TROPONIN I PNL SERPL HS: 6 NG/L (ref 0–20)
COLOR UR: ABNORMAL
EOSINOPHIL # BLD AUTO: 0.01 X10*3/UL (ref 0–0.7)
EOSINOPHIL NFR BLD AUTO: 0.1 %
ERYTHROCYTE [DISTWIDTH] IN BLOOD BY AUTOMATED COUNT: 14.3 % (ref 11.5–14.5)
ERYTHROCYTE [DISTWIDTH] IN BLOOD BY AUTOMATED COUNT: 14.5 % (ref 11.5–14.5)
FIBRINOGEN PPP-MCNC: 307 MG/DL (ref 200–400)
GLUCOSE UR STRIP.AUTO-MCNC: NORMAL MG/DL
HCT VFR BLD AUTO: 35 % (ref 41–52)
HCT VFR BLD AUTO: 36.7 % (ref 41–52)
HGB BLD-MCNC: 11.5 G/DL (ref 13.5–17.5)
HGB BLD-MCNC: 12.2 G/DL (ref 13.5–17.5)
HOLD SPECIMEN: NORMAL
IMM GRANULOCYTES # BLD AUTO: 0.05 X10*3/UL (ref 0–0.7)
IMM GRANULOCYTES NFR BLD AUTO: 0.4 % (ref 0–0.9)
KETONES UR STRIP.AUTO-MCNC: NEGATIVE MG/DL
LEUKOCYTE ESTERASE UR QL STRIP.AUTO: ABNORMAL
LYMPHOCYTES # BLD AUTO: 1.09 X10*3/UL (ref 1.2–4.8)
LYMPHOCYTES NFR BLD AUTO: 9.3 %
MCH RBC QN AUTO: 27.8 PG (ref 26–34)
MCH RBC QN AUTO: 28 PG (ref 26–34)
MCHC RBC AUTO-ENTMCNC: 32.9 G/DL (ref 32–36)
MCHC RBC AUTO-ENTMCNC: 33.2 G/DL (ref 32–36)
MCV RBC AUTO: 84 FL (ref 80–100)
MCV RBC AUTO: 85 FL (ref 80–100)
MONOCYTES # BLD AUTO: 0.68 X10*3/UL (ref 0.1–1)
MONOCYTES NFR BLD AUTO: 5.8 %
NEUTROPHILS # BLD AUTO: 9.84 X10*3/UL (ref 1.2–7.7)
NEUTROPHILS NFR BLD AUTO: 84.2 %
NITRITE UR QL STRIP.AUTO: NEGATIVE
NRBC BLD-RTO: 0 /100 WBCS (ref 0–0)
NRBC BLD-RTO: 0 /100 WBCS (ref 0–0)
PH UR STRIP.AUTO: 5.5 [PH]
PHOSPHATE SERPL-MCNC: 3.5 MG/DL (ref 2.5–4.9)
PLATELET # BLD AUTO: 240 X10*3/UL (ref 150–450)
PLATELET # BLD AUTO: 253 X10*3/UL (ref 150–450)
PROCALCITONIN SERPL-MCNC: 0.03 NG/ML
PROCALCITONIN SERPL-MCNC: 0.07 NG/ML
PROT UR STRIP.AUTO-MCNC: ABNORMAL MG/DL
RBC # BLD AUTO: 4.13 X10*6/UL (ref 4.5–5.9)
RBC # BLD AUTO: 4.35 X10*6/UL (ref 4.5–5.9)
RBC # UR STRIP.AUTO: ABNORMAL /UL
RBC #/AREA URNS AUTO: >20 /HPF
RH FACTOR (ANTIGEN D): NORMAL
SP GR UR STRIP.AUTO: 1.02
UROBILINOGEN UR STRIP.AUTO-MCNC: NORMAL MG/DL
WBC # BLD AUTO: 11.7 X10*3/UL (ref 4.4–11.3)
WBC # BLD AUTO: 13.5 X10*3/UL (ref 4.4–11.3)
WBC #/AREA URNS AUTO: >50 /HPF

## 2024-08-20 PROCEDURE — 84145 PROCALCITONIN (PCT): CPT | Mod: ELYLAB

## 2024-08-20 PROCEDURE — 71045 X-RAY EXAM CHEST 1 VIEW: CPT

## 2024-08-20 PROCEDURE — 71045 X-RAY EXAM CHEST 1 VIEW: CPT | Performed by: RADIOLOGY

## 2024-08-20 PROCEDURE — 84100 ASSAY OF PHOSPHORUS: CPT

## 2024-08-20 PROCEDURE — 2020000001 HC ICU ROOM DAILY

## 2024-08-20 PROCEDURE — 93005 ELECTROCARDIOGRAM TRACING: CPT

## 2024-08-20 PROCEDURE — 2500000004 HC RX 250 GENERAL PHARMACY W/ HCPCS (ALT 636 FOR OP/ED): Performed by: NURSE PRACTITIONER

## 2024-08-20 PROCEDURE — 76857 US EXAM PELVIC LIMITED: CPT

## 2024-08-20 PROCEDURE — 36415 COLL VENOUS BLD VENIPUNCTURE: CPT | Performed by: NURSE PRACTITIONER

## 2024-08-20 PROCEDURE — 2500000005 HC RX 250 GENERAL PHARMACY W/O HCPCS

## 2024-08-20 PROCEDURE — 99291 CRITICAL CARE FIRST HOUR: CPT | Performed by: NURSE PRACTITIONER

## 2024-08-20 PROCEDURE — 2500000001 HC RX 250 WO HCPCS SELF ADMINISTERED DRUGS (ALT 637 FOR MEDICARE OP): Performed by: UROLOGY

## 2024-08-20 PROCEDURE — 85027 COMPLETE CBC AUTOMATED: CPT | Performed by: NURSE PRACTITIONER

## 2024-08-20 PROCEDURE — 85025 COMPLETE CBC W/AUTO DIFF WBC: CPT

## 2024-08-20 PROCEDURE — 76857 US EXAM PELVIC LIMITED: CPT | Performed by: RADIOLOGY

## 2024-08-20 PROCEDURE — 87086 URINE CULTURE/COLONY COUNT: CPT | Mod: ELYLAB | Performed by: NURSE PRACTITIONER

## 2024-08-20 PROCEDURE — 36415 COLL VENOUS BLD VENIPUNCTURE: CPT

## 2024-08-20 PROCEDURE — 2500000004 HC RX 250 GENERAL PHARMACY W/ HCPCS (ALT 636 FOR OP/ED)

## 2024-08-20 PROCEDURE — 85384 FIBRINOGEN ACTIVITY: CPT | Performed by: NURSE PRACTITIONER

## 2024-08-20 PROCEDURE — 86900 BLOOD TYPING SEROLOGIC ABO: CPT | Performed by: NURSE PRACTITIONER

## 2024-08-20 PROCEDURE — 81001 URINALYSIS AUTO W/SCOPE: CPT | Performed by: NURSE PRACTITIONER

## 2024-08-20 PROCEDURE — 84484 ASSAY OF TROPONIN QUANT: CPT | Performed by: NURSE PRACTITIONER

## 2024-08-20 PROCEDURE — 2500000001 HC RX 250 WO HCPCS SELF ADMINISTERED DRUGS (ALT 637 FOR MEDICARE OP): Performed by: NURSE PRACTITIONER

## 2024-08-20 PROCEDURE — 93010 ELECTROCARDIOGRAM REPORT: CPT | Performed by: INTERNAL MEDICINE

## 2024-08-20 PROCEDURE — 84145 PROCALCITONIN (PCT): CPT | Mod: ELYLAB | Performed by: NURSE PRACTITIONER

## 2024-08-20 RX ORDER — METHOCARBAMOL 100 MG/ML
1000 INJECTION, SOLUTION INTRAMUSCULAR; INTRAVENOUS EVERY 8 HOURS PRN
Status: DISCONTINUED | OUTPATIENT
Start: 2024-08-20 | End: 2024-08-20

## 2024-08-20 RX ORDER — METHOCARBAMOL 500 MG/1
500 TABLET, FILM COATED ORAL EVERY 8 HOURS PRN
Status: DISCONTINUED | OUTPATIENT
Start: 2024-08-20 | End: 2024-08-20

## 2024-08-20 RX ORDER — ACETAMINOPHEN 325 MG/1
650 TABLET ORAL EVERY 4 HOURS PRN
Status: DISCONTINUED | OUTPATIENT
Start: 2024-08-20 | End: 2024-08-20

## 2024-08-20 RX ORDER — POLYETHYLENE GLYCOL 3350 17 G/17G
17 POWDER, FOR SOLUTION ORAL DAILY
Status: DISCONTINUED | OUTPATIENT
Start: 2024-08-20 | End: 2024-08-21

## 2024-08-20 RX ORDER — METHOCARBAMOL 500 MG/1
1000 TABLET, FILM COATED ORAL EVERY 8 HOURS PRN
Status: DISCONTINUED | OUTPATIENT
Start: 2024-08-20 | End: 2024-08-23 | Stop reason: HOSPADM

## 2024-08-20 ASSESSMENT — PAIN - FUNCTIONAL ASSESSMENT
PAIN_FUNCTIONAL_ASSESSMENT: 0-10

## 2024-08-20 ASSESSMENT — PAIN SCALES - GENERAL
PAINLEVEL_OUTOF10: 9
PAINLEVEL_OUTOF10: 7
PAINLEVEL_OUTOF10: 0 - NO PAIN
PAINLEVEL_OUTOF10: 4

## 2024-08-20 ASSESSMENT — COGNITIVE AND FUNCTIONAL STATUS - GENERAL
MOBILITY SCORE: 24
DAILY ACTIVITIY SCORE: 24

## 2024-08-20 ASSESSMENT — PAIN DESCRIPTION - DESCRIPTORS: DESCRIPTORS: ACHING;DISCOMFORT

## 2024-08-20 ASSESSMENT — PAIN DESCRIPTION - LOCATION: LOCATION: ABDOMEN

## 2024-08-20 NOTE — NURSING NOTE
Pt having rib cage pain, worse with coughing and movement. Armando Gaspar CNP at bedside. Robaxin given prn for pain. C-Xray, EKG, and troponin ordered per CNP, will continue to monitor pt. No changes in pt's vital signs at this time.

## 2024-08-20 NOTE — NURSING NOTE
Pt having several clots and bloody urine output. Pt's mckeon clotted off with no relief from manual irrigation. Dr. D'Amico at bedside, mckeon irrigated and flow is now patent. Labs drawn. Will continue to monitor pt.

## 2024-08-20 NOTE — PROGRESS NOTES
[facility] Critical Care Medicine       Date:  8/20/2024  Patient:  Robert Wells  YOB: 1960  MRN:  [id number]   Admit Date:  8/19/2024  ========================================================================================================    Chief Complaint   Patient presents with    Blood in Urine     Pt self caths at home, pt had blood in urine and now is unable to urinate due to clots.          History of Present Illness:  Robert Wells is a 64 y.o. year old male patient with Past Medical History of chronic urinary retention requiring straight cath at home, HTN (not on medication).  Patient does not drink alcohol.  He occasionally smokes marijuana.  Patient arrived to [facility] emergency department 8/19 with complaints of abdominal fullness and inability to straight clot.  States he has had a large amount of blood and clots in the urinary catheter will not drain.  He has not had an issue with this in the past.  ED workup was largely unremarkable, hemodynamically stable.  He had a WBC of 12.5, Hgb of 13.9.  CT A/P was obtained which showed presence of blood within the urinary bladder and enlarged prostate gland.  Nursing in the emergency room attempted to place three-way catheter with unsuccessful placement, urology was consulted.  Patient was taken to OR with anesthesia and neurology and underwent cystoscopy.  Cystoscopy showed large burden of blood clots.  Dr. D. Amico was able to place a 24 [place] three-way Cabello catheter.  Patient was admitted to the ICU for acute hematuria with hemorrhagic cystitis and the need for continuous irrigation as well as frequent manual irrigation.      Interval ICU Events:  8/19: Admitted to the ICU for acute hematuria with hemorrhagic cystitis s/p cystoscopy.  Three-way catheter in place.  On glycine continuous irrigation with every 1 hour manual irrigation of blood clots.      Medical History:  History reviewed. No pertinent past medical history.  Past Surgical  "History:   Procedure Laterality Date    COLONOSCOPY  09/19/2016    Colonoscopy    HERNIA REPAIR  07/11/2016    Inguinal Hernia Repair    TONSILLECTOMY  07/11/2016    Tonsillectomy     No medications prior to admission.     Patient has no known allergies.  Social History     Tobacco Use    Smoking status: Former     Current packs/day: 0.00     Types: Cigarettes, Cigars, Pipe     Quit date: 2009     Years since quitting: 15.6    Smokeless tobacco: Former     Types: Snuff, Chew     Quit date: 1994     No family history on file.    Hospital Medications:    glycine, 3,000 mL          Current Facility-Administered Medications:     acetaminophen (Tylenol) tablet 650 mg, 650 mg, oral, q6h PRN, CARRI De Jesus-CNP    cefTRIAXone (Rocephin) 1 g in dextrose (iso) IV 50 mL, 1 g, intravenous, q24h, CARRI De Jesus-CNP    glycine 1.5 % irrigation solution 3,000 mL, 3,000 mL, irrigation, Continuous, Jacques  CARRI De Los Santos-CNP, 3,000 mL at 08/20/24 0419    ondansetron (Zofran) tablet 4 mg, 4 mg, oral, q8h PRN **OR** ondansetron (Zofran) injection 4 mg, 4 mg, intravenous, q8h PRN, Louis D'Amico, MD, 4 mg at 08/19/24 2028    oxyCODONE (Roxicodone) immediate release tablet 5 mg, 5 mg, oral, q6h PRN, Louis D'Amico, MD    oxyCODONE (Roxicodone) immediate release tablet 5 mg, 5 mg, oral, q4h PRN, Mike Treadwell MD    polyethylene glycol (Glycolax, Miralax) packet 17 g, 17 g, oral, Daily PRN, Louis D'Amico, MD        Physical Exam:    Heart Rate:  [66-91]   Temp:  [36 °C (96.8 °F)-36.6 °C (97.9 °F)]   Resp:  [13-24]   BP: (118-162)/()   Height:  [185.4 cm (6' 1\")]   Weight:  [103 kg (227 lb)-107 kg (235 lb 3.7 oz)]   SpO2:  [93 %-100 %]     Physical Exam  Constitutional:       General: He is not in acute distress.     Appearance: Normal appearance. He is not ill-appearing or diaphoretic.   HENT:      Head: Normocephalic and atraumatic.      Mouth/Throat:      Mouth: Mucous membranes are moist.      Pharynx: " Oropharynx is clear. No oropharyngeal exudate.   Eyes:      General: No scleral icterus.     Extraocular Movements: Extraocular movements intact.      Pupils: Pupils are equal, round, and reactive to light.   Cardiovascular:      Rate and Rhythm: Normal rate and regular rhythm.      Pulses: Normal pulses.      Heart sounds: Normal heart sounds. No murmur heard.     No friction rub. No gallop.   Pulmonary:      Effort: Pulmonary effort is normal. No respiratory distress.      Breath sounds: Normal breath sounds. No stridor. No wheezing or rales.   Abdominal:      General: There is no distension.      Palpations: Abdomen is soft.   Genitourinary:     Comments: Three-way Cabello catheter  Hematuria  Musculoskeletal:         General: Normal range of motion.      Cervical back: Normal range of motion and neck supple.   Skin:     General: Skin is warm and dry.      Capillary Refill: Capillary refill takes less than 2 seconds.   Neurological:      General: No focal deficit present.      Mental Status: He is alert and oriented to person, place, and time. Mental status is at baseline.         Objective:    I have reviewed all medications, laboratory results, and imaging pertinent for today's encounter.           Intake/Output Summary (Last 24 hours) at 8/20/2024 0753  Last data filed at 8/19/2024 2033  Gross per 24 hour   Intake 500 ml   Output --   Net 500 ml         Assessment/Plan:    Neuro/Psych/Pain Ctrl/Sedation:  No acute issues, mentation at baseline  -- Tylenol and oxycodone as needed for pain     Respiratory/ENT:  No acute issues, on room air     Cardiovascular:  #HTN  -- Not on home medications, will encouraged to follow-up with PCP for BP monitoring     GI:  No acute issues  -- Regular Diet, bowel regimen     Renal/Volume Status (Intra & Extravascular):  #Hemorrhagic cystitis  #Acute hematuria  #Chronic urinary retention requiring self-catheterization  Underwent cystoscopy and placement of three-way Cabello,  cystoscopy showed large clot burden  -- Continuous bladder irrigation  -- Every 1 hour manual bladder irrigation with saline for removal of clots, anticipate weaning today by urology  -- Strict intake and output monitoring  -- Hold all chemical DVT prophylaxis  -- Daily RFP  -- Urology following    Endocrine  No acute issues, monitor for symptoms of hypo-/hyperglycemia     Infectious Disease:  No overt signs of infection, but concern for cystitis  -- Empiric treatment with Rocephin  -- while mechanical injury as cause of urethral bleeding seems likely, patient does have mild leukocytosis with bands. Send procal with next CBC and UA with reflex culture to help guide decision regarding abx.      Heme/Onc:  CBC 11.5 from 13.9,   -- CBC again at noon and daily, transfuse for Hb goal >7     ORTHO/MSK:  Independent, no PT/OT needs     Ethics/Code Status:  Full code    :  DVT Prophylaxis: holding, SCDs  GI Prophylaxis: none  Bowel Regimen: prn  Diet: Regular  CVC: no  Priya: no  Mckeon: Yes, 3-Way  Restraints: no  Dispo: If HGB at noon stable and urology will allow weaning of mckeon irrigation can leave ICU later today.     Critical Care Time:  35 minutes spent in preparing to see patient (I.e. review of medical records), evaluation of diagnostics (I.e. labs, imaging, etc.), documentation, discussing plan of care with patient/ family/ caregiver, and/ or coordination of care with multidisciplinary team. Time does not include completion of procedure time.       Armando Gaspar, APRN-CNP, DNP  Pulmonary & Critical Care Medicine  [facility]

## 2024-08-20 NOTE — ANESTHESIA POSTPROCEDURE EVALUATION
Patient: Robert Wells    Procedure Summary       Date: 08/19/24 Room / Location: [facility] OR 05 / [facility] OR    Anesthesia Start: 1931 Anesthesia Stop: 2045    Procedure: Cystoscopy with Irrigation Bladder (Bladder) Diagnosis:       Gross hematuria      (Gross hematuria [R31.0])    Surgeons: Louis D'Amico, MD Responsible Provider: Mike Treadwell MD    Anesthesia Type: general ASA Status: 2 - Emergent            Anesthesia Type: general    Vitals Value Taken Time   /91 08/19/24 2045   Temp 36.0 08/19/24 2045   Pulse 83 08/19/24 2045   Resp 16 08/19/24 2045   SpO2 99% 08/19/24 2045       Anesthesia Post Evaluation    Patient location during evaluation: PACU  Patient participation: complete - patient participated  Level of consciousness: awake and alert  Pain management: adequate  Multimodal analgesia pain management approach  Airway patency: patent  Cardiovascular status: acceptable  Respiratory status: acceptable  Hydration status: acceptable  Postoperative Nausea and Vomiting: none        No notable events documented.

## 2024-08-20 NOTE — OP NOTE
Urology postop note for Monday evening 8/19/2024    PREOPERATIVE DIAGNOSIS: [] History of recurrent gross hematuria, urinary clot retention, pelvic pain, history of atonic neurogenic bladder on intermittent self urethral catheterization, elevated PSA level, large volume BPH with bladder neck obstruction    POSTOPERATIVE DIAGNOSIS: [] Same as preop and acute on chronic hematuria and clot retention and secondary hemorrhagic cystitis    OPERATIVE PROCEDURE: []  Cystourethroscopy with urethral calibration and dilation  Bladder irrigation for clots and hematuria    ANESTHESIA: [] Parenteral general anesthesia    ASSISTANTS: [] None    IV FLUIDS: [] As per record    ESTIMATED BLOOD LOSS: [] Over 1000 cc of old clots were irrigated out of the severely distended bladder, mild to moderate pink-tinged urine after irrigation    PROCEDURE AS FOLLOWS: []    AN APPROPRIATE HUDDLE AND TIMEOUT PROCEDURE WAS APPROPRIATELY CARRIED OUT BEFORE AND AFTER ANESTHESIA WAS ADMINISTERED IN SATISFACTORY FASHION WITH SURGEON PATIENT AND ALL OPERATING ROOM PERSONNEL PRESENT ACCORDING TO ROUTINE POLICY.    After administration of satisfactory parenteral MAC IV sedation, the patient was placed in lithotomy position prepped and draped per routine.    The urethra was calibrated and dilated with [place] sounds to 26 [place]    The 22.5 [place] Olympus rigid cystoscopic unit was then utilized  The distal urethra is otherwise unremarkable  The bladder neck shows complete visual obstruction due to an extremely large trilobar prostatic enlargement and hypertrophy, at the end of the procedure the gland is very large, no palpable nodules, soft and spongy    On initial cystoscopic inspection the bladder is full of old clots  Utilizing a piston syringe we then irrigated out well over 1000 cc of old blood clots and eventually were able to reduce the hematuria to a pink-tinged with no clots at the end of the irrigation  Reinspection with both 30 degree and 70  degree lenses simply reveals diffuse hemorrhagic cystitis, there are no obvious bleeding points that could be fulgurated.  No stones no tumors.    After clearing the patient to a medium pink-tinged without clots, we then passed a 24 [place] citizen 30 cc balloon three-way Cabello catheter over a stylette into the bladder and inflated the balloon to 30 cc.  At this point we again irrigated with another 1 to 2 L of sterile water and were able to reduce his bleeding to pink-tinged no clots no active bleeding on rapid rate continuous glycine irrigation    Patient tolerated the procedure well and was returned to the recovery area in satisfactory condition.  No complications.    Follow-up    Patient will be recovered in MICU room 10 and frankly he will need one-on-one care overnight with both continuous glycine irrigation and intermittent manual irrigation to be sure we eventually clear up his bladder.  Patient is on antibiotic coverage with Rocephin  Patient likely will need to stay in ICU pattern overnight and stay in-house probably for 2 or 3 days until the urine is yellow clear and remained so.  Check for infection, continue antibiotics  Continue Flomax and finasteride  Close periodic urologic follow-up, if the patient does not stop his significant gross hematuria he may need transfer to tertiary care level for consideration of alternative methods of controlling bleeding, note [facility] discontinued use of 10% formalin just recently which actually was very effective in treating this form of hemorrhagic cystitis

## 2024-08-20 NOTE — PROGRESS NOTES
[facility] Critical Care Medicine       Date:  8/19/2024  Patient:  Robert Wells  YOB: 1960  MRN:  [id number]   Admit Date:  8/19/2024  ========================================================================================================    Chief Complaint   Patient presents with    Blood in Urine     Pt self caths at home, pt had blood in urine and now is unable to urinate due to clots.          History of Present Illness:  Robert Wells is a 64 y.o. year old male patient with Past Medical History of chronic urinary retention requiring straight cath at home, HTN (not on medication).  Patient does not drink alcohol.  He occasionally smokes marijuana.  Patient arrived to [facility] emergency department 8/19 with complaints of abdominal fullness and inability to straight clot.  States he has had a large amount of blood and clots in the urinary catheter will not drain.  He has not had an issue with this in the past.  ED workup was largely unremarkable, hemodynamically stable.  He had a WBC of 12.5, Hgb of 13.9.  CT A/P was obtained which showed presence of blood within the urinary bladder and enlarged prostate gland.  Nursing in the emergency room attempted to place three-way catheter with unsuccessful placement, urology was consulted.  Patient was taken to OR with anesthesia and neurology and underwent cystoscopy.  Cystoscopy showed large burden of blood clots.  Dr. D. Amico was able to place a 24 Nepali three-way Cabello catheter.  Patient was admitted to the ICU for acute hematuria with hemorrhagic cystitis and the need for continuous irrigation as well as frequent manual irrigation.      Interval ICU Events:  8/19: Admitted to the ICU for acute hematuria with hemorrhagic cystitis s/p cystoscopy.  Three-way catheter in place.  On glycine continuous irrigation.  Will plan for every 1 hour manual irrigation of blood clots.  If stable can transfer in the morning.    Medical History:  No past medical history  on file.  Past Surgical History:   Procedure Laterality Date    COLONOSCOPY  09/19/2016    Colonoscopy    HERNIA REPAIR  07/11/2016    Inguinal Hernia Repair    TONSILLECTOMY  07/11/2016    Tonsillectomy     No medications prior to admission.     Patient has no known allergies.     No family history on file.    Hospital Medications:    glycine, 3,000 mL  lactated Ringer's, 100 mL/hr          Current Facility-Administered Medications:     acetaminophen (Tylenol) tablet 650 mg, 650 mg, oral, q4h PRN **OR** acetaminophen (Tylenol) oral liquid 650 mg, 650 mg, nasogastric tube, q4h PRN **OR** acetaminophen (Tylenol) suppository 650 mg, 650 mg, rectal, q4h PRN, Gopi Garcia MD    acetaminophen (Tylenol) tablet 650 mg, 650 mg, oral, q4h PRN **OR** acetaminophen (Tylenol) oral liquid 650 mg, 650 mg, oral, q4h PRN **OR** acetaminophen (Tylenol) suppository 650 mg, 650 mg, rectal, q4h PRN, Gopi Garcia MD    fentaNYL PF (Sublimaze) injection 25 mcg, 25 mcg, intravenous, q5 min PRN, Mike Treadwell MD    glycine 1.5 % irrigation solution 3,000 mL, 3,000 mL, irrigation, Continuous, Louis D'Amico, MD    hydrALAZINE (Apresoline) injection 10 mg, 10 mg, intravenous, q6h PRN, Gopi Garcia MD    HYDROmorphone (Dilaudid) injection 0.5 mg, 0.5 mg, intravenous, q5 min PRN, Mike Treadwell MD    labetaloL (Normodyne,Trandate) injection 5 mg, 5 mg, intravenous, Once PRN, Mike Treadwell MD    lactated Ringer's infusion, 100 mL/hr, intravenous, Continuous, Mike Treadwell MD    ondansetron (Zofran) tablet 4 mg, 4 mg, oral, q8h PRN **OR** ondansetron (Zofran) injection 4 mg, 4 mg, intravenous, q8h PRN, Gopi Garcia MD, 4 mg at 08/19/24 2028    ondansetron (Zofran) injection 4 mg, 4 mg, intravenous, Once PRN, Mike Treadwell MD    oxyCODONE (Roxicodone) immediate release tablet 5 mg, 5 mg, oral, q6h PRN, Gopi Garcia MD    polyethylene glycol (Glycolax, Miralax) packet 17 g, 17 g, oral, Daily PRN, Gopi Garcia MD    promethazine  "(Phenergan) 6.25 mg in sodium chloride 0.9% 50 mL IV, 6.25 mg, intravenous, Once PRN, Mike Treadwell MD    Review of Systems:  14 point review of systems was completed and negative except for those specially mention in my HPI    Physical Exam:    Heart Rate:  [73-91]   Temp:  [36 °C (96.8 °F)-36.5 °C (97.7 °F)]   Resp:  [13-24]   BP: (143-160)/()   Height:  [185.4 cm (6' 1\")]   Weight:  [103 kg (227 lb)]   SpO2:  [94 %-100 %]     Physical Exam  Constitutional:       General: He is not in acute distress.     Appearance: Normal appearance. He is not ill-appearing or diaphoretic.   HENT:      Head: Normocephalic and atraumatic.      Mouth/Throat:      Mouth: Mucous membranes are moist.      Pharynx: Oropharynx is clear. No oropharyngeal exudate.   Eyes:      General: No scleral icterus.     Extraocular Movements: Extraocular movements intact.      Pupils: Pupils are equal, round, and reactive to light.   Cardiovascular:      Rate and Rhythm: Normal rate and regular rhythm.      Pulses: Normal pulses.      Heart sounds: Normal heart sounds. No murmur heard.     No friction rub. No gallop.   Pulmonary:      Effort: Pulmonary effort is normal. No respiratory distress.      Breath sounds: Normal breath sounds. No stridor. No wheezing or rales.   Abdominal:      General: There is no distension.      Palpations: Abdomen is soft.   Genitourinary:     Comments: Three-way Cabello catheter  Hematuria  Musculoskeletal:         General: Normal range of motion.      Cervical back: Normal range of motion and neck supple.   Skin:     General: Skin is warm and dry.      Capillary Refill: Capillary refill takes less than 2 seconds.   Neurological:      General: No focal deficit present.      Mental Status: He is alert and oriented to person, place, and time. Mental status is at baseline.         Objective:    I have reviewed all medications, laboratory results, and imaging pertinent for today's encounter.           Intake/Output " Summary (Last 24 hours) at 8/19/2024 2149  Last data filed at 8/19/2024 2033  Gross per 24 hour   Intake 500 ml   Output --   Net 500 ml         Assessment/Plan:    Neuro/Psych/Pain Ctrl/Sedation:  No acute issues, mentation at baseline  -- Tylenol and oxycodone as needed for pain     Respiratory/ENT:  No acute issues, on room air     Cardiovascular:  #HTN  -- Not on home medications, will encouraged to follow-up with PCP for BP monitoring     GI:  No acute issues  -- Regular Diet     Renal/Volume Status (Intra & Extravascular):  #Hemorrhagic cystitis  #Acute hematuria  #Chronic urinary retention requiring self-catheterization  Underwent cystoscopy and placement of three-way Cabello, cystoscopy showed large clot burden  -- Continuous bladder irrigation  -- Every 1 hour manual bladder irrigation with saline for removal of clots  -- Strict intake and output monitoring  -- Hold all chemical DVT prophylaxis  -- Daily RFP  -- Urology following    Endocrine  No acute issues, monitor for symptoms of hypo-/hyperglycemia     Infectious Disease:  No overt signs of infection, but concern for cystitis  -- Empiric treatment with Rocephin, can consider de-escalation to Keflex     Heme/Onc:  No acute issues, CBC stable  -- Daily CBC, transfuse for Hb goal >7     ORTHO/MSK:  Independent, no PT/OT needs     Ethics/Code Status:  Full code      :  DVT Prophylaxis: holding, SCDs  GI Prophylaxis: none  Bowel Regimen: prn  Diet: Regular  CVC: no  Priya: no  Cabello: Yes, 3-Way  Restraints: no  Dispo: ICU    Critical Care Time:  35 minutes spent in preparing to see patient (I.e. review of medical records), evaluation of diagnostics (I.e. labs, imaging, etc.), documentation, discussing plan of care with patient/ family/ caregiver, and/ or coordination of care with multidisciplinary team. Time does not include completion of procedure time.       Jacques De Los Santos, APRN-CNP  Pulmonary & Critical Care Medicine  [facility]  Center

## 2024-08-20 NOTE — CONSULTS
Urology consult note for this evening Monday, 8/19/2024    SUBJECTIVE: Pleasant 64-year-old white male currently being referred urologically with acute onset of severe gross hematuria and clot retention  In summary the patient was initially referred to us a few months ago for second opinion on rising PSA levels most recently to a level of 18, however iso-PSA was around 7 with recommended limit of 6 indicating perhaps slightly lower risk of occult prostate cancer  Based on scanning the patient has an extremely large prostate  Another complicating feature is longstanding chronic neurogenic atonic bladder and the patient does intermittent self catheterization approximately 3 times per day  Patient reports that he has had bloody urine on and off sometimes after self catheterization however in the past 2 weeks he began having increasing amounts of dark red cherry colored urine with clots and he could not empty the bladder with his intermittent self catheter and presented to the emergency room.  In the emergency room they were unable to pass a three-way catheter and therefore we were consulted on an emergency basis for further management  Additional medical comorbidities as listed and reviewed     OBJECTIVE:   On today's visit prior to procedure the patient is comfortable appropriately interactive, lower abdominal area is somewhat distended and tender to pressure presumably from bladder distention.  Lab work reviewed and is essentially within range of normal  Patient's CAT scan was reviewed and agree with the report as noted, there is considerable debris layering the bladder probably clots and a very large prostate.    ASSESSMENT/PLAN  Urgent surgical approach with cystoscopy, dilation, bladder irrigation for clots and hematuria and inspect the bladder cystoscopically for any active bleeding and fulgurated as needed  The considered procedures along with alternatives, complications, risks, benefits, short and long-term  followup were reviewed in detail and patient indicates understanding and is agreeable to proceed and followup as recommended.    Additional medical and historical objective data reviewed and listed below      Current Facility-Administered Medications:     acetaminophen (Tylenol) tablet 650 mg, 650 mg, oral, q4h PRN **OR** acetaminophen (Tylenol) oral liquid 650 mg, 650 mg, nasogastric tube, q4h PRN **OR** acetaminophen (Tylenol) suppository 650 mg, 650 mg, rectal, q4h PRN, Gopi Garcia MD    acetaminophen (Tylenol) tablet 650 mg, 650 mg, oral, q4h PRN **OR** acetaminophen (Tylenol) oral liquid 650 mg, 650 mg, oral, q4h PRN **OR** acetaminophen (Tylenol) suppository 650 mg, 650 mg, rectal, q4h PRN, Gopi Garcia MD    fentaNYL PF (Sublimaze) injection 25 mcg, 25 mcg, intravenous, q5 min PRN, Mike Treadwell MD    glycine 1.5 % irrigation solution 3,000 mL, 3,000 mL, irrigation, Continuous, Louis D'Amico, MD    hydrALAZINE (Apresoline) injection 10 mg, 10 mg, intravenous, q6h PRN, Gopi Garcia MD    HYDROmorphone (Dilaudid) injection 0.5 mg, 0.5 mg, intravenous, q5 min PRN, Mike Treadwell MD    labetaloL (Normodyne,Trandate) injection 5 mg, 5 mg, intravenous, Once PRN, Mike Treadwell MD    lactated Ringer's infusion, 100 mL/hr, intravenous, Continuous, Mike Treadwell MD    ondansetron (Zofran) tablet 4 mg, 4 mg, oral, q8h PRN **OR** ondansetron (Zofran) injection 4 mg, 4 mg, intravenous, q8h PRN, Gopi Garcia MD, 4 mg at 08/19/24 2028    ondansetron (Zofran) injection 4 mg, 4 mg, intravenous, Once PRN, Mike Treadwell MD    oxyCODONE (Roxicodone) immediate release tablet 5 mg, 5 mg, oral, q6h PRN, Gopi Garcia MD    polyethylene glycol (Glycolax, Miralax) packet 17 g, 17 g, oral, Daily PRN, Gopi Garcia MD    promethazine (Phenergan) 6.25 mg in sodium chloride 0.9% 50 mL IV, 6.25 mg, intravenous, Once PRN, Mike Treadwell MD     Results for orders placed or performed during the hospital encounter of 08/19/24  (from the past 96 hour(s))   CBC and Auto Differential   Result Value Ref Range    WBC 12.5 (H) 4.4 - 11.3 x10*3/uL    nRBC 0.0 0.0 - 0.0 /100 WBCs    RBC 4.95 4.50 - 5.90 x10*6/uL    Hemoglobin 13.9 13.5 - 17.5 g/dL    Hematocrit 41.7 41.0 - 52.0 %    MCV 84 80 - 100 fL    MCH 28.1 26.0 - 34.0 pg    MCHC 33.3 32.0 - 36.0 g/dL    RDW 14.1 11.5 - 14.5 %    Platelets 264 150 - 450 x10*3/uL    Neutrophils % 88.8 40.0 - 80.0 %    Immature Granulocytes %, Automated 0.2 0.0 - 0.9 %    Lymphocytes % 6.3 13.0 - 44.0 %    Monocytes % 4.4 2.0 - 10.0 %    Eosinophils % 0.1 0.0 - 6.0 %    Basophils % 0.2 0.0 - 2.0 %    Neutrophils Absolute 11.12 (H) 1.20 - 7.70 x10*3/uL    Immature Granulocytes Absolute, Automated 0.03 0.00 - 0.70 x10*3/uL    Lymphocytes Absolute 0.79 (L) 1.20 - 4.80 x10*3/uL    Monocytes Absolute 0.55 0.10 - 1.00 x10*3/uL    Eosinophils Absolute 0.01 0.00 - 0.70 x10*3/uL    Basophils Absolute 0.03 0.00 - 0.10 x10*3/uL   Comprehensive Metabolic Panel   Result Value Ref Range    Glucose 98 74 - 99 mg/dL    Sodium 138 136 - 145 mmol/L    Potassium 4.4 3.5 - 5.3 mmol/L    Chloride 106 98 - 107 mmol/L    Bicarbonate 25 21 - 32 mmol/L    Anion Gap 11 10 - 20 mmol/L    Urea Nitrogen 14 6 - 23 mg/dL    Creatinine 0.85 0.50 - 1.30 mg/dL    eGFR >90 >60 mL/min/1.73m*2    Calcium 9.2 8.6 - 10.3 mg/dL    Albumin 4.2 3.4 - 5.0 g/dL    Alkaline Phosphatase 45 33 - 136 U/L    Total Protein 7.0 6.4 - 8.2 g/dL    AST 14 9 - 39 U/L    Bilirubin, Total 0.5 0.0 - 1.2 mg/dL    ALT 4 (L) 10 - 52 U/L   Protime-INR   Result Value Ref Range    Protime 11.5 9.8 - 12.8 seconds    INR 1.0 0.9 - 1.1   aPTT   Result Value Ref Range    aPTT 31 27 - 38 seconds   Gray Top   Result Value Ref Range    Extra Tube Hold for add-ons.      CT abdomen pelvis wo IV contrast    Result Date: 8/19/2024  STUDY: CT Abdomen and Pelvis without IV Contrast; 08/19/2024 at 2:21 PM INDICATION: Hematuria. COMPARISON: MRI prostate 06/11/24. ACCESSION NUMBER(S):  [id number] ORDERING CLINICIAN: WESTLEY MEJIA TECHNIQUE: CT of the abdomen and pelvis was performed.  Contiguous axial images were obtained at 3 mm slice thickness through the abdomen and pelvis. Coronal and sagittal reconstructions at 3 mm slice thickness were performed. No intravenous contrast was administered.  Automated mA/kV exposure control was utilized and patient examination was performed in strict accordance with principles of ALARA. FINDINGS: Please note that the evaluation of vessels, lymph nodes and organs is limited without intravenous contrast. Evaluation of the abdominal viscera is suboptimal due to lack of IV contrast.  No acute findings are seen within the unenhanced liver, spleen, pancreas, or adrenal glands.  No acute findings are seen within either unenhanced kidney.  There is no evidence of hydronephrosis or nephrolithiasis.  No ureteral calculi are visualized.  There is high density material in the urinary bladder, indicative of blood.  An underlying mass cannot be excluded.  The prostate gland is severely enlarged at 7.1 x 7.3 cm.  No dilated loops of small bowel or colon are visualized.  There are diverticula seen in the sigmoid colon.  There is no evidence of diverticulitis.  The appendix is normal in appearance.  No enlarged lymph nodes are seen within the pelvic sidewalls, iliac chains, or retroperitoneum.  There is no evidence of aneurysmal dilatation of the abdominal aorta. Degenerative change are seen throughout the lumbar spine.    1.  Presence of blood within urinary bladder.  An underlying mass cannot be excluded.  Correlation with cystoscopy is recommended. 2.  Enlarged prostate gland. 3.  No evidence of hydronephrosis or nephrolithiasis. 4.  Sigmoid diverticulosis, with no evidence of diverticulitis. Signed by Zacarias Rodriguez MD

## 2024-08-20 NOTE — PROGRESS NOTES
Urology progress note for Tuesday, 8/20/2024,  Overnight the patient gradually cleared his urine via large diameter three-way Cabello catheter to continuous glycine irrigation as well as manual intermittent irrigation  Did have return of hematuria during movement out of bed in a chair  However we again irrigated patient's catheter manually this morning and it irrigates clear, nursing reports that he did pass a few small clots during movement in and out of bed  Bedside bladder ultrasound was performed and shows a well-positioned Cabello catheter within the bladder lumen and no definite debris within the bladder itself  Prostate measured at extremely large size of over 260 cm³  Continue bladder irrigation for now until the patient has yellow clear urine for at least 2 days at which point he can be discharged outpatient follow-up with indwelling Cabello catheter and home health care  We intend to leave the indwelling Cabello catheter for at least about 2 weeks to allow the severe cystitis/hemorrhagic cystitis to heal prior to consideration of removal for voiding trial noting that patient has had difficulty with intermittent self-catheterization  At some point in the near future we will plan referral to tertiary care level center for consideration of simple or retropubic prostatectomy  Reviewed with patient and agreeable  Otherwise continue supportive care    Additional medical and historical objective data reviewed and listed below      Current Facility-Administered Medications:     acetaminophen (Tylenol) tablet 650 mg, 650 mg, oral, q6h PRN, KIARA De Jesus    cefTRIAXone (Rocephin) 1 g in dextrose (iso) IV 50 mL, 1 g, intravenous, q24h, KIARA De Jesus    glycine 1.5 % irrigation solution 3,000 mL, 3,000 mL, irrigation, Continuous, KIARA De Jesus, 3,000 mL at 08/20/24 0419    ondansetron (Zofran) tablet 4 mg, 4 mg, oral, q8h PRN **OR** ondansetron (Zofran) injection 4 mg, 4 mg,  intravenous, q8h PRN, Louis D'Amico, MD, 4 mg at 08/19/24 2028    oxyCODONE (Roxicodone) immediate release tablet 5 mg, 5 mg, oral, q6h PRN, Louis D'Amico, MD    oxyCODONE (Roxicodone) immediate release tablet 5 mg, 5 mg, oral, q4h PRN, Mike Treadwell MD    polyethylene glycol (Glycolax, Miralax) packet 17 g, 17 g, oral, Daily PRN, Louis D'Amico, MD    polyethylene glycol (Glycolax, Miralax) packet 17 g, 17 g, oral, Daily, Armando Gaspar, APRN-CNP, DNP, 17 g at 08/20/24 1235     Results for orders placed or performed during the hospital encounter of 08/19/24 (from the past 96 hour(s))   CBC and Auto Differential   Result Value Ref Range    WBC 12.5 (H) 4.4 - 11.3 x10*3/uL    nRBC 0.0 0.0 - 0.0 /100 WBCs    RBC 4.95 4.50 - 5.90 x10*6/uL    Hemoglobin 13.9 13.5 - 17.5 g/dL    Hematocrit 41.7 41.0 - 52.0 %    MCV 84 80 - 100 fL    MCH 28.1 26.0 - 34.0 pg    MCHC 33.3 32.0 - 36.0 g/dL    RDW 14.1 11.5 - 14.5 %    Platelets 264 150 - 450 x10*3/uL    Neutrophils % 88.8 40.0 - 80.0 %    Immature Granulocytes %, Automated 0.2 0.0 - 0.9 %    Lymphocytes % 6.3 13.0 - 44.0 %    Monocytes % 4.4 2.0 - 10.0 %    Eosinophils % 0.1 0.0 - 6.0 %    Basophils % 0.2 0.0 - 2.0 %    Neutrophils Absolute 11.12 (H) 1.20 - 7.70 x10*3/uL    Immature Granulocytes Absolute, Automated 0.03 0.00 - 0.70 x10*3/uL    Lymphocytes Absolute 0.79 (L) 1.20 - 4.80 x10*3/uL    Monocytes Absolute 0.55 0.10 - 1.00 x10*3/uL    Eosinophils Absolute 0.01 0.00 - 0.70 x10*3/uL    Basophils Absolute 0.03 0.00 - 0.10 x10*3/uL   Comprehensive Metabolic Panel   Result Value Ref Range    Glucose 98 74 - 99 mg/dL    Sodium 138 136 - 145 mmol/L    Potassium 4.4 3.5 - 5.3 mmol/L    Chloride 106 98 - 107 mmol/L    Bicarbonate 25 21 - 32 mmol/L    Anion Gap 11 10 - 20 mmol/L    Urea Nitrogen 14 6 - 23 mg/dL    Creatinine 0.85 0.50 - 1.30 mg/dL    eGFR >90 >60 mL/min/1.73m*2    Calcium 9.2 8.6 - 10.3 mg/dL    Albumin 4.2 3.4 - 5.0 g/dL    Alkaline Phosphatase 45 33 -  136 U/L    Total Protein 7.0 6.4 - 8.2 g/dL    AST 14 9 - 39 U/L    Bilirubin, Total 0.5 0.0 - 1.2 mg/dL    ALT 4 (L) 10 - 52 U/L   Protime-INR   Result Value Ref Range    Protime 11.5 9.8 - 12.8 seconds    INR 1.0 0.9 - 1.1   aPTT   Result Value Ref Range    aPTT 31 27 - 38 seconds   Gray Top   Result Value Ref Range    Extra Tube Hold for add-ons.    CBC and Auto Differential   Result Value Ref Range    WBC 11.7 (H) 4.4 - 11.3 x10*3/uL    nRBC 0.0 0.0 - 0.0 /100 WBCs    RBC 4.13 (L) 4.50 - 5.90 x10*6/uL    Hemoglobin 11.5 (L) 13.5 - 17.5 g/dL    Hematocrit 35.0 (L) 41.0 - 52.0 %    MCV 85 80 - 100 fL    MCH 27.8 26.0 - 34.0 pg    MCHC 32.9 32.0 - 36.0 g/dL    RDW 14.5 11.5 - 14.5 %    Platelets 240 150 - 450 x10*3/uL    Neutrophils % 84.2 40.0 - 80.0 %    Immature Granulocytes %, Automated 0.4 0.0 - 0.9 %    Lymphocytes % 9.3 13.0 - 44.0 %    Monocytes % 5.8 2.0 - 10.0 %    Eosinophils % 0.1 0.0 - 6.0 %    Basophils % 0.2 0.0 - 2.0 %    Neutrophils Absolute 9.84 (H) 1.20 - 7.70 x10*3/uL    Immature Granulocytes Absolute, Automated 0.05 0.00 - 0.70 x10*3/uL    Lymphocytes Absolute 1.09 (L) 1.20 - 4.80 x10*3/uL    Monocytes Absolute 0.68 0.10 - 1.00 x10*3/uL    Eosinophils Absolute 0.01 0.00 - 0.70 x10*3/uL    Basophils Absolute 0.02 0.00 - 0.10 x10*3/uL   Phosphorus   Result Value Ref Range    Phosphorus 3.5 2.5 - 4.9 mg/dL   Procalcitonin   Result Value Ref Range    Procalcitonin 0.03 <=0.07 ng/mL   Urinalysis with Reflex Culture and Microscopic   Result Value Ref Range    Color, Urine Dark-Brown (N) Light-Yellow, Yellow, Dark-Yellow    Appearance, Urine Ex.Turbid (N) Clear    Specific Gravity, Urine 1.016 1.005 - 1.035    pH, Urine 5.5 5.0, 5.5, 6.0, 6.5, 7.0, 7.5, 8.0    Protein, Urine 100 (2+) (A) NEGATIVE, 10 (TRACE), 20 (TRACE) mg/dL    Glucose, Urine Normal Normal mg/dL    Blood, Urine OVER (3+) (A) NEGATIVE    Ketones, Urine NEGATIVE NEGATIVE mg/dL    Bilirubin, Urine NEGATIVE NEGATIVE    Urobilinogen,  Urine Normal Normal mg/dL    Nitrite, Urine NEGATIVE NEGATIVE    Leukocyte Esterase, Urine 250 Yolanda/µL (A) NEGATIVE   Microscopic Only, Urine   Result Value Ref Range    WBC, Urine >50 (A) 1-5, NONE /HPF    RBC, Urine >20 (A) NONE, 1-2, 3-5 /HPF   CBC   Result Value Ref Range    WBC 13.5 (H) 4.4 - 11.3 x10*3/uL    nRBC 0.0 0.0 - 0.0 /100 WBCs    RBC 4.35 (L) 4.50 - 5.90 x10*6/uL    Hemoglobin 12.2 (L) 13.5 - 17.5 g/dL    Hematocrit 36.7 (L) 41.0 - 52.0 %    MCV 84 80 - 100 fL    MCH 28.0 26.0 - 34.0 pg    MCHC 33.2 32.0 - 36.0 g/dL    RDW 14.3 11.5 - 14.5 %    Platelets 253 150 - 450 x10*3/uL   Type and screen   Result Value Ref Range    ABO TYPE O     Rh TYPE POS     ANTIBODY SCREEN NEG    Fibrinogen   Result Value Ref Range    Fibrinogen 307 200 - 400 mg/dL     US bladder    Result Date: 8/20/2024  Interpreted By:  Santiago Solis, STUDY: US BLADDER;  8/20/2024 1:07 pm   INDICATION: Signs/Symptoms:Severe gross hematuria with clots status post irrigation, check for residual clots or bladder distention at the bedside thanks.   COMPARISON: CT abdomen and pelvis without contrast 19 August 2024   ACCESSION NUMBER(S): [id number]   ORDERING CLINICIAN: LOUIS D'AMICO   TECHNIQUE: Transverse and longitudinal grayscale and color Doppler of the urinary bladder before and after voiding   FINDINGS: Shadowing stone:  Negative Urinary bladder debris:  Cannot evaluate; completely emptied via well-positioned Cabello catheter Other filling defects:  Evaluation incomplete but Cabello catheter balloon is in the lumen Wall thickening:  Cannot evaluate; completely emptied via well-positioned Cabello catheter Diverticula:  Cannot evaluate; completely emptied via well-positioned Cabello catheter   Other:  Prostate was measured with better sensitivity and specificity on MRI 11 June 2024 that is today. It measured at least 10 x 7 x 7.2 cm on prior MRI with calculated volume around 260 cubic cm       At the time of this exam, the urinary bladder was  completely empty with well-positioned Cabello catheter in place. Evaluation of the urinary bladder lumen for pathologic filling defects (anything other than the Cabello catheter) is extremely limited with the urinary bladder being completely collapsed as it is on these images   Markedly enlarged prostate probably underestimated for size on today's exam compared to more sensitive and specific MRI 11 June 2024 when it measured at least 10 x 7 x 7.2 cm with calculated volume around 260 cubic cm   MACRO: None   Signed by: Santiago Solis 8/20/2024 1:36 PM Dictation workstation:   [id number]    CT abdomen pelvis wo IV contrast    Result Date: 8/19/2024  STUDY: CT Abdomen and Pelvis without IV Contrast; 08/19/2024 at 2:21 PM INDICATION: Hematuria. COMPARISON: MRI prostate 06/11/24. ACCESSION NUMBER(S): [id number] ORDERING CLINICIAN: WESTLEY MEJIA TECHNIQUE: CT of the abdomen and pelvis was performed.  Contiguous axial images were obtained at 3 mm slice thickness through the abdomen and pelvis. Coronal and sagittal reconstructions at 3 mm slice thickness were performed. No intravenous contrast was administered.  Automated mA/kV exposure control was utilized and patient examination was performed in strict accordance with principles of ALARA. FINDINGS: Please note that the evaluation of vessels, lymph nodes and organs is limited without intravenous contrast. Evaluation of the abdominal viscera is suboptimal due to lack of IV contrast.  No acute findings are seen within the unenhanced liver, spleen, pancreas, or adrenal glands.  No acute findings are seen within either unenhanced kidney.  There is no evidence of hydronephrosis or nephrolithiasis.  No ureteral calculi are visualized.  There is high density material in the urinary bladder, indicative of blood.  An underlying mass cannot be excluded.  The prostate gland is severely enlarged at 7.1 x 7.3 cm.  No dilated loops of small bowel or colon are visualized.  There are  diverticula seen in the sigmoid colon.  There is no evidence of diverticulitis.  The appendix is normal in appearance.  No enlarged lymph nodes are seen within the pelvic sidewalls, iliac chains, or retroperitoneum.  There is no evidence of aneurysmal dilatation of the abdominal aorta. Degenerative change are seen throughout the lumbar spine.    1.  Presence of blood within urinary bladder.  An underlying mass cannot be excluded.  Correlation with cystoscopy is recommended. 2.  Enlarged prostate gland. 3.  No evidence of hydronephrosis or nephrolithiasis. 4.  Sigmoid diverticulosis, with no evidence of diverticulitis. Signed by Zacarias Rodriguez MD

## 2024-08-21 LAB
ANION GAP SERPL CALC-SCNC: 9 MMOL/L (ref 10–20)
BASOPHILS # BLD AUTO: 0.04 X10*3/UL (ref 0–0.1)
BASOPHILS NFR BLD AUTO: 0.4 %
BUN SERPL-MCNC: 14 MG/DL (ref 6–23)
CALCIUM SERPL-MCNC: 8.4 MG/DL (ref 8.6–10.3)
CHLORIDE SERPL-SCNC: 108 MMOL/L (ref 98–107)
CO2 SERPL-SCNC: 27 MMOL/L (ref 21–32)
CREAT SERPL-MCNC: 0.71 MG/DL (ref 0.5–1.3)
EGFRCR SERPLBLD CKD-EPI 2021: >90 ML/MIN/1.73M*2
EOSINOPHIL # BLD AUTO: 0.13 X10*3/UL (ref 0–0.7)
EOSINOPHIL NFR BLD AUTO: 1.3 %
ERYTHROCYTE [DISTWIDTH] IN BLOOD BY AUTOMATED COUNT: 14.5 % (ref 11.5–14.5)
GLUCOSE SERPL-MCNC: 94 MG/DL (ref 74–99)
HCT VFR BLD AUTO: 32.2 % (ref 41–52)
HGB BLD-MCNC: 10.7 G/DL (ref 13.5–17.5)
IMM GRANULOCYTES # BLD AUTO: 0.03 X10*3/UL (ref 0–0.7)
IMM GRANULOCYTES NFR BLD AUTO: 0.3 % (ref 0–0.9)
LYMPHOCYTES # BLD AUTO: 2.46 X10*3/UL (ref 1.2–4.8)
LYMPHOCYTES NFR BLD AUTO: 25.2 %
MAGNESIUM SERPL-MCNC: 2.08 MG/DL (ref 1.6–2.4)
MCH RBC QN AUTO: 28.2 PG (ref 26–34)
MCHC RBC AUTO-ENTMCNC: 33.2 G/DL (ref 32–36)
MCV RBC AUTO: 85 FL (ref 80–100)
MONOCYTES # BLD AUTO: 0.72 X10*3/UL (ref 0.1–1)
MONOCYTES NFR BLD AUTO: 7.4 %
NEUTROPHILS # BLD AUTO: 6.39 X10*3/UL (ref 1.2–7.7)
NEUTROPHILS NFR BLD AUTO: 65.4 %
NRBC BLD-RTO: 0 /100 WBCS (ref 0–0)
PHOSPHATE SERPL-MCNC: 3.7 MG/DL (ref 2.5–4.9)
PLATELET # BLD AUTO: 219 X10*3/UL (ref 150–450)
POTASSIUM SERPL-SCNC: 3.9 MMOL/L (ref 3.5–5.3)
RBC # BLD AUTO: 3.8 X10*6/UL (ref 4.5–5.9)
SODIUM SERPL-SCNC: 140 MMOL/L (ref 136–145)
WBC # BLD AUTO: 9.8 X10*3/UL (ref 4.4–11.3)

## 2024-08-21 PROCEDURE — 36415 COLL VENOUS BLD VENIPUNCTURE: CPT

## 2024-08-21 PROCEDURE — 2500000004 HC RX 250 GENERAL PHARMACY W/ HCPCS (ALT 636 FOR OP/ED)

## 2024-08-21 PROCEDURE — 2500000001 HC RX 250 WO HCPCS SELF ADMINISTERED DRUGS (ALT 637 FOR MEDICARE OP): Performed by: NURSE PRACTITIONER

## 2024-08-21 PROCEDURE — 85025 COMPLETE CBC W/AUTO DIFF WBC: CPT

## 2024-08-21 PROCEDURE — 2500000004 HC RX 250 GENERAL PHARMACY W/ HCPCS (ALT 636 FOR OP/ED): Performed by: NURSE PRACTITIONER

## 2024-08-21 PROCEDURE — 2500000005 HC RX 250 GENERAL PHARMACY W/O HCPCS

## 2024-08-21 PROCEDURE — 84100 ASSAY OF PHOSPHORUS: CPT

## 2024-08-21 PROCEDURE — 2020000001 HC ICU ROOM DAILY

## 2024-08-21 PROCEDURE — 83735 ASSAY OF MAGNESIUM: CPT

## 2024-08-21 PROCEDURE — 80048 BASIC METABOLIC PNL TOTAL CA: CPT

## 2024-08-21 PROCEDURE — 99291 CRITICAL CARE FIRST HOUR: CPT

## 2024-08-21 RX ORDER — ENOXAPARIN SODIUM 100 MG/ML
40 INJECTION SUBCUTANEOUS DAILY
Status: DISCONTINUED | OUTPATIENT
Start: 2024-08-21 | End: 2024-08-21

## 2024-08-21 RX ORDER — HYDRALAZINE HYDROCHLORIDE 20 MG/ML
10 INJECTION INTRAMUSCULAR; INTRAVENOUS EVERY 4 HOURS PRN
Status: DISCONTINUED | OUTPATIENT
Start: 2024-08-21 | End: 2024-08-22

## 2024-08-21 ASSESSMENT — PAIN - FUNCTIONAL ASSESSMENT
PAIN_FUNCTIONAL_ASSESSMENT: 0-10
PAIN_FUNCTIONAL_ASSESSMENT: 0-10

## 2024-08-21 ASSESSMENT — PAIN SCALES - GENERAL
PAINLEVEL_OUTOF10: 0 - NO PAIN
PAINLEVEL_OUTOF10: 0 - NO PAIN

## 2024-08-21 NOTE — PROGRESS NOTES
[facility] Critical Care Medicine       Date:  8/21/2024  Patient:  Robert Wells  YOB: 1960  MRN:  [id number]   Admit Date:  8/19/2024    Chief Complaint   Patient presents with    Blood in Urine     Pt self caths at home, pt had blood in urine and now is unable to urinate due to clots.          History of Present Illness:  Robert Wells is a 64 y.o. year old male patient with Past Medical History of chronic urinary retention requiring straight cath at home, HTN (not on medication).  Patient does not drink alcohol.  He occasionally smokes marijuana.  Patient arrived to [facility] emergency department 8/19 with complaints of abdominal fullness and inability to straight cath.  States he has had a large amount of blood and clots in the urinary catheter will not drain.  He has not had an issue with this in the past.  ED workup was largely unremarkable, hemodynamically stable.  He had a WBC of 12.5, Hgb of 13.9.  CT A/P was obtained which showed presence of blood within the urinary bladder and enlarged prostate gland.  Nursing in the emergency room attempted to place three-way catheter with unsuccessful placement, urology was consulted.  Patient was taken to OR with anesthesia and neurology and underwent cystoscopy.  Cystoscopy showed large burden of blood clots.  Dr. D. Amico was able to place a 24 Sami three-way Mckeon catheter.  Patient was admitted to the ICU for acute hematuria with hemorrhagic cystitis and the need for continuous irrigation as well as frequent manual irrigation.        Interval ICU Events:  8/19: Admitted to the ICU for acute hematuria with hemorrhagic cystitis s/p cystoscopy.  Three-way catheter in place.  On glycine continuous irrigation with every 1 hour manual irrigation of blood clots.      8/21: No acute events overnight. Per nursing, pt had some clots in mckeon bag when his position changed overnight. No reported clots this morning but mckeon bag did have some blood that was  cleared with irrigation. Continue irrigation q1hrs for now pending new urology recs today.     Medical History:  History reviewed. No pertinent past medical history.  Past Surgical History:   Procedure Laterality Date    COLONOSCOPY  09/19/2016    Colonoscopy    HERNIA REPAIR  07/11/2016    Inguinal Hernia Repair    TONSILLECTOMY  07/11/2016    Tonsillectomy     No medications prior to admission.     Patient has no known allergies.  Social History     Tobacco Use    Smoking status: Former     Current packs/day: 0.00     Types: Cigarettes, Cigars, Pipe     Quit date: 2009     Years since quitting: 15.6    Smokeless tobacco: Former     Types: Snuff, Chew     Quit date: 1994     No family history on file.    Hospital Medications:    glycine, 3,000 mL, Last Rate: 3,000 mL (08/20/24 2000)          Current Facility-Administered Medications:     acetaminophen (Tylenol) tablet 650 mg, 650 mg, oral, q6h PRN, KIARA De Jesus, 650 mg at 08/21/24 0122    cefTRIAXone (Rocephin) 1 g in dextrose (iso) IV 50 mL, 1 g, intravenous, q24h, KIARA De Jesus, Stopped at 08/20/24 1652    glycine 1.5 % irrigation solution 3,000 mL, 3,000 mL, irrigation, Continuous, KIARA De Jesus, Last Rate: 500 mL/hr at 08/20/24 2000, 3,000 mL at 08/21/24 0436    methocarbamol (Robaxin) tablet 1,000 mg, 1,000 mg, oral, q8h PRN, KIARA Garza, DNP, 1,000 mg at 08/21/24 0123    ondansetron (Zofran) tablet 4 mg, 4 mg, oral, q8h PRN **OR** ondansetron (Zofran) injection 4 mg, 4 mg, intravenous, q8h PRN, Louis D'Amico, MD, 4 mg at 08/19/24 2028    oxyCODONE (Roxicodone) immediate release tablet 5 mg, 5 mg, oral, q6h PRN, Louis D'Amico, MD, 5 mg at 08/20/24 2205    oxyCODONE (Roxicodone) immediate release tablet 5 mg, 5 mg, oral, q4h PRN, Mike Treadwell MD    polyethylene glycol (Glycolax, Miralax) packet 17 g, 17 g, oral, Daily PRN, Louis D'Amico, MD    polyethylene glycol (Glycolax, Miralax) packet 17 g, 17 g,  oral, Daily, Armando Gaspar, APRN-CNP, DNP, 17 g at 08/20/24 1235    Review of Systems:  14 point review of systems was completed and negative except for those specially mention in my HPI    Physical Exam:    Heart Rate:  [66-80]   Temp:  [36.1 °C (97 °F)-36.9 °C (98.4 °F)]   Resp:  [16-28]   BP: ()/()   SpO2:  [93 %-98 %]     Physical Exam  Constitutional:       General: He is not in acute distress.     Appearance: He is not ill-appearing or toxic-appearing.   HENT:      Head: Normocephalic and atraumatic.      Mouth/Throat:      Mouth: Mucous membranes are moist.   Eyes:      Conjunctiva/sclera: Conjunctivae normal.      Pupils: Pupils are equal, round, and reactive to light.   Cardiovascular:      Rate and Rhythm: Normal rate and regular rhythm.   Abdominal:      General: There is no distension.      Palpations: Abdomen is soft.      Tenderness: There is no abdominal tenderness.   Musculoskeletal:      Right lower leg: No edema.      Left lower leg: No edema.   Skin:     General: Skin is warm.      Capillary Refill: Capillary refill takes less than 2 seconds.   Neurological:      General: No focal deficit present.      Mental Status: He is alert and oriented to person, place, and time.         Objective:    I have reviewed all medications, laboratory results, and imaging pertinent for today's encounter.           Intake/Output Summary (Last 24 hours) at 8/21/2024 0734  Last data filed at 8/20/2024 2100  Gross per 24 hour   Intake 1640 ml   Output --   Net 1640 ml         Assessment/Plan:    I am currently managing this critically ill patient for the following problems:    Neuro/Psych/Pain Ctrl/Sedation:  No active concerns  - Pain control: tylenol, oxycodone, robaxin PRN   - CAM ICU, sleep-wake hygiene, delirium precautions      Respiratory/ENT:  No active concerns   - Supplemental O2: on room air      Cardiovascular:  HTN  - Not on home medications, encouraged to follow-up with PCP for BP  monitoring  - PRN hydralazine for sustained SBP >160     GI:  No acute issues  - Diet: regular   - BR: miralax PRN     Renal/Volume Status (Intra & Extravascular):  Hemorrhagic cystitis  Acute hematuria  Chronic urinary retention requiring self-catheterization  Hx BPH  Underwent cystoscopy and placement of three-way Mckeon, cystoscopy showed large clot burden  - Continuous bladder irrigation until clear yellow urine x48 hrs  - Every 1 hour manual bladder irrigation with saline for removal of clots, anticipate weaning today by urology  - Strict intake and output monitoring  - Urology following, appreciate recs   -- pt will need mckeon to maintain in place x2 weeks after discharge to allow adequate healing   - Daily RFP, Mag    Endocrine  No active concerns   - Consider adding SSI if glucose persistently >180     Infectious Disease:  No overt signs of infection, but concern for cystitis  - Empiric treatment with Rocephin x 6 days (8/20- **)  - UA: significant WBC and leuk esterase   - Urine culture: pending   - Procal negative      Heme/Onc:  CBC 13.9 on admission    - Hgb 10.7 this am, likely now reflective of prior blood loss   - Ok to start DVT prophylaxis today   - Transfuse for Hb goal >7  - CBC daily      ORTHO/MSK:  Independent, no PT/OT needs     Ethics/Code Status:  Full code     :  DVT Prophylaxis: SCDs, lovenox  GI Prophylaxis: none  Bowel Regimen: prn  Diet: Regular  CVC: no  Priya: no  Mckeon: Yes, 3-Way  Restraints: no  Dispo: ICU, will need home health care referral closer to discharge    Critical Care Time:  35 minutes spent in preparing to see patient (I.e. labs, imaging, etc.), documentation, discussion plan of care with patient/family/caregiver, and/or coordination of care with multidisciplinary team including the attending. Time does not include completion of procedure time.     Plan discussed with Dr. Lo.     SALAS ObandoC  Pulmonary & Critical Care Medicine   [facility]

## 2024-08-21 NOTE — PROGRESS NOTES
Urology progress report for Wednesday, 8/21/2024,  Comfortable today sitting up in bed  Three-way Cabello catheter on continuous irrigation, urine is much more clear today although the patient did pass a few small clots with irrigation earlier this morning  Having some bladder pressure and spasms which are probably related to the larger diameter catheter as well as continuous irrigation  Should be okay for transfer to regular nursing floor later today  We are planning to continue continuous irrigation for another 24 hours, changed to intermittent irrigation as needed and will encourage ambulation and observation while in hospital for possible recurrence of the hematuria prior to considering discharge to outpatient follow-up  We are planning to leave the Cabello catheter for about 2 weeks and will do voiding trial with home health care at that point  Again in the near future we will refer to tertiary care level urology for an opinion on further management including possible prostatectomy and given the extremely large size of the prostate this might be better treated with a traditional retropubic open or laparoscopic prostatectomy    Otherwise continue supportive care    Additional medical and historical objective data reviewed and listed below      Current Facility-Administered Medications:     acetaminophen (Tylenol) tablet 650 mg, 650 mg, oral, q6h PRN, KIARA De Jesus, 650 mg at 08/21/24 0122    cefTRIAXone (Rocephin) 1 g in dextrose (iso) IV 50 mL, 1 g, intravenous, q24h, KIARA De Jesus, Stopped at 08/20/24 1652    glycine 1.5 % irrigation solution 3,000 mL, 3,000 mL, irrigation, Continuous, KIARA De Jesus, Last Rate: 500 mL/hr at 08/20/24 2000, 3,000 mL at 08/21/24 1045    hydrALAZINE (Apresoline) injection 10 mg, 10 mg, intravenous, q4h PRN, Tyler Palmer PA-C    methocarbamol (Robaxin) tablet 1,000 mg, 1,000 mg, oral, q8h PRN, KIARA Garza, DNP, 1,000 mg at  08/21/24 0123    ondansetron (Zofran) tablet 4 mg, 4 mg, oral, q8h PRN **OR** ondansetron (Zofran) injection 4 mg, 4 mg, intravenous, q8h PRN, Louis D'Amico, MD, 4 mg at 08/19/24 2028    oxyCODONE (Roxicodone) immediate release tablet 5 mg, 5 mg, oral, q6h PRN, Louis D'Amico, MD, 5 mg at 08/20/24 2205    oxyCODONE (Roxicodone) immediate release tablet 5 mg, 5 mg, oral, q4h PRN, Mike Treadwell MD    polyethylene glycol (Glycolax, Miralax) packet 17 g, 17 g, oral, Daily PRN, Louis D'Amico, MD   Rcnt  XR chest 1 view    Result Date: 8/21/2024  Interpreted By:  Chad Hamilton, STUDY: XR CHEST 1 VIEW;  8/20/2024 5:41 pm   INDICATION: Signs/Symptoms:rib pain.   COMPARISON: None.   ACCESSION NUMBER(S): [id number]   ORDERING CLINICIAN: YULI BACH   FINDINGS: CHEST/LUNGS: The cardiac and mediastinal silhouettes are within normal limits for the technique. No focal areas of consolidation are noted. No effusion or pneumothorax is seen.   Minimal areas of atelectasis at the lung bases.   UPPER ABDOMEN: No remarkable upper abdominal findings.   OSSEOUS STRUCTURES: No acute changes.       No radiographic evidence of an acute cardiopulmonary process.   MACRO: None.   Signed by: Chad Hamilton 8/21/2024 8:01 AM Dictation workstation:   [id number]    ECG 12 lead    Result Date: 8/20/2024  Normal sinus rhythm Right bundle branch block Left anterior fascicular block Abnormal ECG When compared with ECG of 07-SEP-2016 06:13, T wave inversion now evident in Anterior leads    US bladder    Result Date: 8/20/2024  Interpreted By:  Santiago Solis, STUDY: US BLADDER;  8/20/2024 1:07 pm   INDICATION: Signs/Symptoms:Severe gross hematuria with clots status post irrigation, check for residual clots or bladder distention at the bedside thanks.   COMPARISON: CT abdomen and pelvis without contrast 19 August 2024   ACCESSION NUMBER(S): [id number]   ORDERING CLINICIAN: LOUIS D'AMICO   TECHNIQUE: Transverse and longitudinal grayscale and color  Doppler of the urinary bladder before and after voiding   FINDINGS: Shadowing stone:  Negative Urinary bladder debris:  Cannot evaluate; completely emptied via well-positioned Cabello catheter Other filling defects:  Evaluation incomplete but Cabello catheter balloon is in the lumen Wall thickening:  Cannot evaluate; completely emptied via well-positioned Cabello catheter Diverticula:  Cannot evaluate; completely emptied via well-positioned Cabello catheter   Other:  Prostate was measured with better sensitivity and specificity on MRI 11 June 2024 that is today. It measured at least 10 x 7 x 7.2 cm on prior MRI with calculated volume around 260 cubic cm       At the time of this exam, the urinary bladder was completely empty with well-positioned Cabello catheter in place. Evaluation of the urinary bladder lumen for pathologic filling defects (anything other than the Cabello catheter) is extremely limited with the urinary bladder being completely collapsed as it is on these images   Markedly enlarged prostate probably underestimated for size on today's exam compared to more sensitive and specific MRI 11 June 2024 when it measured at least 10 x 7 x 7.2 cm with calculated volume around 260 cubic cm   MACRO: None   Signed by: Santiago Solis 8/20/2024 1:36 PM Dictation workstation:   [id number]    CT abdomen pelvis wo IV contrast    Result Date: 8/19/2024  STUDY: CT Abdomen and Pelvis without IV Contrast; 08/19/2024 at 2:21 PM INDICATION: Hematuria. COMPARISON: MRI prostate 06/11/24. ACCESSION NUMBER(S): [id number] ORDERING CLINICIAN: WESTLEY MEJIA TECHNIQUE: CT of the abdomen and pelvis was performed.  Contiguous axial images were obtained at 3 mm slice thickness through the abdomen and pelvis. Coronal and sagittal reconstructions at 3 mm slice thickness were performed. No intravenous contrast was administered.  Automated mA/kV exposure control was utilized and patient examination was performed in strict accordance with  principles of ALARA. FINDINGS: Please note that the evaluation of vessels, lymph nodes and organs is limited without intravenous contrast. Evaluation of the abdominal viscera is suboptimal due to lack of IV contrast.  No acute findings are seen within the unenhanced liver, spleen, pancreas, or adrenal glands.  No acute findings are seen within either unenhanced kidney.  There is no evidence of hydronephrosis or nephrolithiasis.  No ureteral calculi are visualized.  There is high density material in the urinary bladder, indicative of blood.  An underlying mass cannot be excluded.  The prostate gland is severely enlarged at 7.1 x 7.3 cm.  No dilated loops of small bowel or colon are visualized.  There are diverticula seen in the sigmoid colon.  There is no evidence of diverticulitis.  The appendix is normal in appearance.  No enlarged lymph nodes are seen within the pelvic sidewalls, iliac chains, or retroperitoneum.  There is no evidence of aneurysmal dilatation of the abdominal aorta. Degenerative change are seen throughout the lumbar spine.    1.  Presence of blood within urinary bladder.  An underlying mass cannot be excluded.  Correlation with cystoscopy is recommended. 2.  Enlarged prostate gland. 3.  No evidence of hydronephrosis or nephrolithiasis. 4.  Sigmoid diverticulosis, with no evidence of diverticulitis. Signed by Zacarias Rodriguez MD

## 2024-08-22 LAB
ANION GAP SERPL CALC-SCNC: 9 MMOL/L (ref 10–20)
ATRIAL RATE: 70 BPM
BACTERIA UR CULT: NO GROWTH
BASOPHILS # BLD AUTO: 0.04 X10*3/UL (ref 0–0.1)
BASOPHILS NFR BLD AUTO: 0.4 %
BUN SERPL-MCNC: 14 MG/DL (ref 6–23)
CALCIUM SERPL-MCNC: 8.4 MG/DL (ref 8.6–10.3)
CHLORIDE SERPL-SCNC: 104 MMOL/L (ref 98–107)
CO2 SERPL-SCNC: 28 MMOL/L (ref 21–32)
CREAT SERPL-MCNC: 0.74 MG/DL (ref 0.5–1.3)
EGFRCR SERPLBLD CKD-EPI 2021: >90 ML/MIN/1.73M*2
EOSINOPHIL # BLD AUTO: 0.15 X10*3/UL (ref 0–0.7)
EOSINOPHIL NFR BLD AUTO: 1.6 %
ERYTHROCYTE [DISTWIDTH] IN BLOOD BY AUTOMATED COUNT: 14.2 % (ref 11.5–14.5)
GLUCOSE SERPL-MCNC: 124 MG/DL (ref 74–99)
HCT VFR BLD AUTO: 31.6 % (ref 41–52)
HGB BLD-MCNC: 10.3 G/DL (ref 13.5–17.5)
IMM GRANULOCYTES # BLD AUTO: 0.05 X10*3/UL (ref 0–0.7)
IMM GRANULOCYTES NFR BLD AUTO: 0.5 % (ref 0–0.9)
LYMPHOCYTES # BLD AUTO: 2.1 X10*3/UL (ref 1.2–4.8)
LYMPHOCYTES NFR BLD AUTO: 22.1 %
MAGNESIUM SERPL-MCNC: 2.04 MG/DL (ref 1.6–2.4)
MCH RBC QN AUTO: 27.8 PG (ref 26–34)
MCHC RBC AUTO-ENTMCNC: 32.6 G/DL (ref 32–36)
MCV RBC AUTO: 85 FL (ref 80–100)
MONOCYTES # BLD AUTO: 0.64 X10*3/UL (ref 0.1–1)
MONOCYTES NFR BLD AUTO: 6.7 %
NEUTROPHILS # BLD AUTO: 6.53 X10*3/UL (ref 1.2–7.7)
NEUTROPHILS NFR BLD AUTO: 68.7 %
NRBC BLD-RTO: 0 /100 WBCS (ref 0–0)
P AXIS: 69 DEGREES
P OFFSET: 168 MS
P ONSET: 110 MS
PHOSPHATE SERPL-MCNC: 3.7 MG/DL (ref 2.5–4.9)
PLATELET # BLD AUTO: 243 X10*3/UL (ref 150–450)
POTASSIUM SERPL-SCNC: 3.6 MMOL/L (ref 3.5–5.3)
PR INTERVAL: 196 MS
Q ONSET: 208 MS
QRS COUNT: 11 BEATS
QRS DURATION: 166 MS
QT INTERVAL: 438 MS
QTC CALCULATION(BAZETT): 473 MS
QTC FREDERICIA: 461 MS
R AXIS: -64 DEGREES
RBC # BLD AUTO: 3.7 X10*6/UL (ref 4.5–5.9)
SODIUM SERPL-SCNC: 137 MMOL/L (ref 136–145)
T AXIS: -15 DEGREES
T OFFSET: 427 MS
VENTRICULAR RATE: 70 BPM
WBC # BLD AUTO: 9.5 X10*3/UL (ref 4.4–11.3)

## 2024-08-22 PROCEDURE — 83735 ASSAY OF MAGNESIUM: CPT

## 2024-08-22 PROCEDURE — 2500000005 HC RX 250 GENERAL PHARMACY W/O HCPCS

## 2024-08-22 PROCEDURE — 80048 BASIC METABOLIC PNL TOTAL CA: CPT

## 2024-08-22 PROCEDURE — 36415 COLL VENOUS BLD VENIPUNCTURE: CPT

## 2024-08-22 PROCEDURE — 85025 COMPLETE CBC W/AUTO DIFF WBC: CPT

## 2024-08-22 PROCEDURE — 99233 SBSQ HOSP IP/OBS HIGH 50: CPT

## 2024-08-22 PROCEDURE — 2500000004 HC RX 250 GENERAL PHARMACY W/ HCPCS (ALT 636 FOR OP/ED)

## 2024-08-22 PROCEDURE — 1210000001 HC SEMI-PRIVATE ROOM DAILY

## 2024-08-22 PROCEDURE — 84100 ASSAY OF PHOSPHORUS: CPT

## 2024-08-22 RX ORDER — HYDRALAZINE HYDROCHLORIDE 20 MG/ML
10 INJECTION INTRAMUSCULAR; INTRAVENOUS EVERY 4 HOURS PRN
Status: DISCONTINUED | OUTPATIENT
Start: 2024-08-22 | End: 2024-08-23 | Stop reason: HOSPADM

## 2024-08-22 RX ORDER — TAMSULOSIN HYDROCHLORIDE 0.4 MG/1
0.4 CAPSULE ORAL
Status: DISCONTINUED | OUTPATIENT
Start: 2024-08-22 | End: 2024-08-23 | Stop reason: HOSPADM

## 2024-08-22 ASSESSMENT — COGNITIVE AND FUNCTIONAL STATUS - GENERAL
MOBILITY SCORE: 24
CLIMB 3 TO 5 STEPS WITH RAILING: A LITTLE
MOBILITY SCORE: 23
DAILY ACTIVITIY SCORE: 24
DAILY ACTIVITIY SCORE: 24

## 2024-08-22 ASSESSMENT — PAIN SCALES - GENERAL
PAINLEVEL_OUTOF10: 0 - NO PAIN

## 2024-08-22 ASSESSMENT — PAIN - FUNCTIONAL ASSESSMENT: PAIN_FUNCTIONAL_ASSESSMENT: 0-10

## 2024-08-22 NOTE — CARE PLAN
The patient's goals for the shift include      The clinical goals for the shift include will have no blood clots present in mckeon this shift    Problem: Pain  Goal: Takes deep breaths with improved pain control throughout the shift  Outcome: Progressing  Goal: Turns in bed with improved pain control throughout the shift  Outcome: Progressing  Goal: Walks with improved pain control throughout the shift  Outcome: Progressing  Goal: Performs ADL's with improved pain control throughout shift  Outcome: Progressing  Goal: Participates in PT with improved pain control throughout the shift  Outcome: Progressing  Goal: Free from opioid side effects throughout the shift  Outcome: Progressing  Goal: Free from acute confusion related to pain meds throughout the shift  Outcome: Progressing     Problem: Safety - Adult  Goal: Free from fall injury  Outcome: Progressing

## 2024-08-22 NOTE — PROGRESS NOTES
[facility] Critical Care Medicine       Date:  8/22/2024  Patient:  Robert Wells  YOB: 1960  MRN:  [id number]   Admit Date:  8/19/2024    Chief Complaint   Patient presents with    Blood in Urine     Pt self caths at home, pt had blood in urine and now is unable to urinate due to clots.          History of Present Illness:  Robert Wells is a 64 y.o. year old male patient with Past Medical History of chronic urinary retention requiring straight cath at home, HTN (not on medication).  Patient does not drink alcohol.  He occasionally smokes marijuana.  Patient arrived to [facility] emergency department 8/19 with complaints of abdominal fullness and inability to straight cath.  States he has had a large amount of blood and clots in the urinary catheter will not drain.  He has not had an issue with this in the past.  ED workup was largely unremarkable, hemodynamically stable.  He had a WBC of 12.5, Hgb of 13.9.  CT A/P was obtained which showed presence of blood within the urinary bladder and enlarged prostate gland.  Nursing in the emergency room attempted to place three-way catheter with unsuccessful placement, urology was consulted.  Patient was taken to OR with anesthesia and neurology and underwent cystoscopy.  Cystoscopy showed large burden of blood clots.  Dr. D. Amico was able to place a 24 [place] three-way Mckeon catheter.  Patient was admitted to the ICU for acute hematuria with hemorrhagic cystitis and the need for continuous irrigation as well as frequent manual irrigation.        Interval ICU Events:  8/19: Admitted to the ICU for acute hematuria with hemorrhagic cystitis s/p cystoscopy.  Three-way catheter in place.  On glycine continuous irrigation with every 1 hour manual irrigation of blood clots.      8/21: No acute events overnight. Per nursing, pt had some clots in mckeon bag when his position changed overnight. No reported clots this morning but mckeon bag did have some blood that was cleared  with irrigation. Continue irrigation q1hrs for now pending new urology recs today.     8/22: irrigation changed to intermittent. Patient has been without clots today.    Medical History:  History reviewed. No pertinent past medical history.  Past Surgical History:   Procedure Laterality Date    COLONOSCOPY  09/19/2016    Colonoscopy    HERNIA REPAIR  07/11/2016    Inguinal Hernia Repair    TONSILLECTOMY  07/11/2016    Tonsillectomy     No medications prior to admission.     Patient has no known allergies.  Social History     Tobacco Use    Smoking status: Former     Current packs/day: 0.00     Types: Cigarettes, Cigars, Pipe     Quit date: 2009     Years since quitting: 15.6    Smokeless tobacco: Former     Types: Snuff, Chew     Quit date: 1994     No family history on file.    Hospital Medications:    glycine, 3,000 mL, Last Rate: 3,000 mL (08/20/24 2000)          Current Facility-Administered Medications:     acetaminophen (Tylenol) tablet 650 mg, 650 mg, oral, q6h PRN, CARRI De Jesus-CNP, 650 mg at 08/21/24 0122    cefTRIAXone (Rocephin) 1 g in dextrose (iso) IV 50 mL, 1 g, intravenous, q24h, CARRI De Jesus-CNP, Stopped at 08/21/24 1633    glycine 1.5 % irrigation solution 3,000 mL, 3,000 mL, irrigation, Continuous, CARRI De Jesus-CNP, Last Rate: 500 mL/hr at 08/20/24 2000, 3,000 mL at 08/22/24 0300    hydrALAZINE (Apresoline) injection 10 mg, 10 mg, intravenous, q4h PRN, Tyler Palmer PA-C    methocarbamol (Robaxin) tablet 1,000 mg, 1,000 mg, oral, q8h PRN, CARRI Garza-CNP, DNP, 1,000 mg at 08/21/24 0123    ondansetron (Zofran) tablet 4 mg, 4 mg, oral, q8h PRN **OR** ondansetron (Zofran) injection 4 mg, 4 mg, intravenous, q8h PRN, Louis D'Amico, MD, 4 mg at 08/19/24 2028    oxyCODONE (Roxicodone) immediate release tablet 5 mg, 5 mg, oral, q6h PRN, Louis D'Amico, MD, 5 mg at 08/20/24 7259    oxyCODONE (Roxicodone) immediate release tablet 5 mg, 5 mg, oral, q4h PRN, Mike  MD Eben    polyethylene glycol (Glycolax, Miralax) packet 17 g, 17 g, oral, Daily PRN, Louis D'Amico, MD    Review of Systems:  14 point review of systems was completed and negative except for those specially mention in my HPI    Physical Exam:    Heart Rate:  [64-82]   Temp:  [36.2 °C (97.2 °F)]   Resp:  [16-35]   BP: (134-185)/()   Weight:  [106 kg (233 lb 7.5 oz)-107 kg (235 lb 0.2 oz)]   SpO2:  [96 %]     Physical Exam  Constitutional:       General: He is not in acute distress.     Appearance: He is not ill-appearing or toxic-appearing.   HENT:      Head: Normocephalic and atraumatic.      Mouth/Throat:      Mouth: Mucous membranes are moist.   Eyes:      Conjunctiva/sclera: Conjunctivae normal.      Pupils: Pupils are equal, round, and reactive to light.   Cardiovascular:      Rate and Rhythm: Normal rate and regular rhythm.   Abdominal:      General: There is no distension.      Palpations: Abdomen is soft.      Tenderness: There is no abdominal tenderness.   Musculoskeletal:      Right lower leg: No edema.      Left lower leg: No edema.   Skin:     General: Skin is warm.      Capillary Refill: Capillary refill takes less than 2 seconds.   Neurological:      General: No focal deficit present.      Mental Status: He is alert and oriented to person, place, and time.         Objective:    I have reviewed all medications, laboratory results, and imaging pertinent for today's encounter.           Intake/Output Summary (Last 24 hours) at 8/22/2024 0730  Last data filed at 8/21/2024 0900  Gross per 24 hour   Intake 50 ml   Output --   Net 50 ml         Assessment/Plan:    I am currently managing this critically ill patient for the following problems:    Neuro/Psych/Pain Ctrl/Sedation:  No active concerns  - Pain control: tylenol, oxycodone, robaxin PRN   - CAM ICU, sleep-wake hygiene, delirium precautions      Respiratory/ENT:  No active concerns   - Supplemental O2: on room air       Cardiovascular:  HTN  - Not on home medications, encouraged to follow-up with PCP for BP monitoring  - PRN hydralazine for sustained SBP >180     GI:  No acute issues  - Diet: regular   - BR: miralax PRN     Renal/Volume Status (Intra & Extravascular):  Hemorrhagic cystitis  Acute hematuria  Chronic urinary retention requiring self-catheterization  Hx BPH  Underwent cystoscopy and placement of three-way Mckeon, cystoscopy showed large clot burden  - Continuous bladder irrigation until clear yellow urine x48 hrs  - Strict intake and output monitoring  - Urology following, appreciate recs   -- pt will need mckeon to maintain in place x2 weeks after discharge to allow adequate healing   - Daily RFP, Mag    Endocrine  No active concerns   - Consider adding SSI if glucose persistently >180     Infectious Disease:  No overt signs of infection, but concern for cystitis  - Empiric treatment with Rocephin x 6 days (stop 8/26)  - UA: significant WBC and leuk esterase   - Urine culture: pending   - Procal negative      Heme/Onc:  CBC 13.9 on admission    - Hgb 10.3  - Transfuse for Hb goal >7  - CBC daily      ORTHO/MSK:  Independent, no PT/OT needs     Ethics/Code Status:  Full code     :  DVT Prophylaxis: SCDs, lovenox  GI Prophylaxis: none  Bowel Regimen: prn  Diet: Regular  CVC: no  Priya: no  Mckeon: Yes, 3-Way  Restraints: no  Dispo: transfer to [facility]    Critical Care Time:  35 minutes spent in preparing to see patient (I.e. labs, imaging, etc.), documentation, discussion plan of care with patient/family/caregiver, and/or coordination of care with multidisciplinary team including the attending. Time does not include completion of procedure time.     Plan discussed with Dr. Lo.       08/22/24 at 7:30 AM - CARRI Sharma-CNP

## 2024-08-22 NOTE — PROGRESS NOTES
Urology progress note for Friday, 8/22/2024  Patient transferred out of MICU and now on regular nursing floor  Lying in bed  Comfortable  Urine yellow clear per Cabello catheter  Patient has begun good movement and ambulation  Recommend observing 24 hours, possible discharge tomorrow afternoon with indwelling Cabello catheter and home health care on Flomax and remove Cabello even about 2 weeks and observe for a voiding trial  Continue Flomax twice a day  Eventually we will discuss possible referral for second opinion at tertiary care level and consideration of?  Retropubic prostatectomy at some point in the near future  Reviewed and agreeable  Otherwise continue supportive care    Additional medical and historical objective data reviewed and listed below      Current Facility-Administered Medications:     acetaminophen (Tylenol) tablet 650 mg, 650 mg, oral, q6h PRN, KIARA Babcock, 650 mg at 08/21/24 0122    cefTRIAXone (Rocephin) 1 g in dextrose (iso) IV 50 mL, 1 g, intravenous, q24h, KIARA Babcock, Stopped at 08/21/24 1633    [Held by provider] glycine 1.5 % irrigation solution 3,000 mL, 3,000 mL, irrigation, Continuous, KIARA De Jesus, Stopped at 08/22/24 1317    hydrALAZINE (Apresoline) injection 10 mg, 10 mg, intravenous, q4h PRN, KIARA Babcock    methocarbamol (Robaxin) tablet 1,000 mg, 1,000 mg, oral, q8h PRN, KIARA Babcock, 1,000 mg at 08/21/24 0123    ondansetron (Zofran) tablet 4 mg, 4 mg, oral, q8h PRN **OR** ondansetron (Zofran) injection 4 mg, 4 mg, intravenous, q8h PRN, KIARA Babcock, 4 mg at 08/19/24 2028    oxyCODONE (Roxicodone) immediate release tablet 5 mg, 5 mg, oral, q6h PRN, KIARA Babcock, 5 mg at 08/20/24 2205    oxyCODONE (Roxicodone) immediate release tablet 5 mg, 5 mg, oral, q4h PRN, Mariam Willis, APRN-CNP    polyethylene glycol (Glycolax, Miralax) packet 17 g, 17 g, oral, Daily PRN, Mariam  Lazaro, APRN-CNP  Results for orders placed or performed during the hospital encounter of 08/19/24 (from the past 96 hour(s))   CBC and Auto Differential   Result Value Ref Range    WBC 12.5 (H) 4.4 - 11.3 x10*3/uL    nRBC 0.0 0.0 - 0.0 /100 WBCs    RBC 4.95 4.50 - 5.90 x10*6/uL    Hemoglobin 13.9 13.5 - 17.5 g/dL    Hematocrit 41.7 41.0 - 52.0 %    MCV 84 80 - 100 fL    MCH 28.1 26.0 - 34.0 pg    MCHC 33.3 32.0 - 36.0 g/dL    RDW 14.1 11.5 - 14.5 %    Platelets 264 150 - 450 x10*3/uL    Neutrophils % 88.8 40.0 - 80.0 %    Immature Granulocytes %, Automated 0.2 0.0 - 0.9 %    Lymphocytes % 6.3 13.0 - 44.0 %    Monocytes % 4.4 2.0 - 10.0 %    Eosinophils % 0.1 0.0 - 6.0 %    Basophils % 0.2 0.0 - 2.0 %    Neutrophils Absolute 11.12 (H) 1.20 - 7.70 x10*3/uL    Immature Granulocytes Absolute, Automated 0.03 0.00 - 0.70 x10*3/uL    Lymphocytes Absolute 0.79 (L) 1.20 - 4.80 x10*3/uL    Monocytes Absolute 0.55 0.10 - 1.00 x10*3/uL    Eosinophils Absolute 0.01 0.00 - 0.70 x10*3/uL    Basophils Absolute 0.03 0.00 - 0.10 x10*3/uL   Comprehensive Metabolic Panel   Result Value Ref Range    Glucose 98 74 - 99 mg/dL    Sodium 138 136 - 145 mmol/L    Potassium 4.4 3.5 - 5.3 mmol/L    Chloride 106 98 - 107 mmol/L    Bicarbonate 25 21 - 32 mmol/L    Anion Gap 11 10 - 20 mmol/L    Urea Nitrogen 14 6 - 23 mg/dL    Creatinine 0.85 0.50 - 1.30 mg/dL    eGFR >90 >60 mL/min/1.73m*2    Calcium 9.2 8.6 - 10.3 mg/dL    Albumin 4.2 3.4 - 5.0 g/dL    Alkaline Phosphatase 45 33 - 136 U/L    Total Protein 7.0 6.4 - 8.2 g/dL    AST 14 9 - 39 U/L    Bilirubin, Total 0.5 0.0 - 1.2 mg/dL    ALT 4 (L) 10 - 52 U/L   Protime-INR   Result Value Ref Range    Protime 11.5 9.8 - 12.8 seconds    INR 1.0 0.9 - 1.1   aPTT   Result Value Ref Range    aPTT 31 27 - 38 seconds   Gray Top   Result Value Ref Range    Extra Tube Hold for add-ons.    CBC and Auto Differential   Result Value Ref Range    WBC 11.7 (H) 4.4 - 11.3 x10*3/uL    nRBC 0.0 0.0 - 0.0 /100  WBCs    RBC 4.13 (L) 4.50 - 5.90 x10*6/uL    Hemoglobin 11.5 (L) 13.5 - 17.5 g/dL    Hematocrit 35.0 (L) 41.0 - 52.0 %    MCV 85 80 - 100 fL    MCH 27.8 26.0 - 34.0 pg    MCHC 32.9 32.0 - 36.0 g/dL    RDW 14.5 11.5 - 14.5 %    Platelets 240 150 - 450 x10*3/uL    Neutrophils % 84.2 40.0 - 80.0 %    Immature Granulocytes %, Automated 0.4 0.0 - 0.9 %    Lymphocytes % 9.3 13.0 - 44.0 %    Monocytes % 5.8 2.0 - 10.0 %    Eosinophils % 0.1 0.0 - 6.0 %    Basophils % 0.2 0.0 - 2.0 %    Neutrophils Absolute 9.84 (H) 1.20 - 7.70 x10*3/uL    Immature Granulocytes Absolute, Automated 0.05 0.00 - 0.70 x10*3/uL    Lymphocytes Absolute 1.09 (L) 1.20 - 4.80 x10*3/uL    Monocytes Absolute 0.68 0.10 - 1.00 x10*3/uL    Eosinophils Absolute 0.01 0.00 - 0.70 x10*3/uL    Basophils Absolute 0.02 0.00 - 0.10 x10*3/uL   Phosphorus   Result Value Ref Range    Phosphorus 3.5 2.5 - 4.9 mg/dL   Procalcitonin   Result Value Ref Range    Procalcitonin 0.03 <=0.07 ng/mL   Urinalysis with Reflex Culture and Microscopic   Result Value Ref Range    Color, Urine Dark-Brown (N) Light-Yellow, Yellow, Dark-Yellow    Appearance, Urine Ex.Turbid (N) Clear    Specific Gravity, Urine 1.016 1.005 - 1.035    pH, Urine 5.5 5.0, 5.5, 6.0, 6.5, 7.0, 7.5, 8.0    Protein, Urine 100 (2+) (A) NEGATIVE, 10 (TRACE), 20 (TRACE) mg/dL    Glucose, Urine Normal Normal mg/dL    Blood, Urine OVER (3+) (A) NEGATIVE    Ketones, Urine NEGATIVE NEGATIVE mg/dL    Bilirubin, Urine NEGATIVE NEGATIVE    Urobilinogen, Urine Normal Normal mg/dL    Nitrite, Urine NEGATIVE NEGATIVE    Leukocyte Esterase, Urine 250 Yolanda/µL (A) NEGATIVE   Extra Urine Gray Tube   Result Value Ref Range    Extra Tube Hold for add-ons.    Microscopic Only, Urine   Result Value Ref Range    WBC, Urine >50 (A) 1-5, NONE /HPF    RBC, Urine >20 (A) NONE, 1-2, 3-5 /HPF   Urine Culture    Specimen: Indwelling (Cabello) Catheter; Urine   Result Value Ref Range    Urine Culture No growth    Procalcitonin   Result  Value Ref Range    Procalcitonin 0.07 <=0.07 ng/mL   CBC   Result Value Ref Range    WBC 13.5 (H) 4.4 - 11.3 x10*3/uL    nRBC 0.0 0.0 - 0.0 /100 WBCs    RBC 4.35 (L) 4.50 - 5.90 x10*6/uL    Hemoglobin 12.2 (L) 13.5 - 17.5 g/dL    Hematocrit 36.7 (L) 41.0 - 52.0 %    MCV 84 80 - 100 fL    MCH 28.0 26.0 - 34.0 pg    MCHC 33.2 32.0 - 36.0 g/dL    RDW 14.3 11.5 - 14.5 %    Platelets 253 150 - 450 x10*3/uL   Type and screen   Result Value Ref Range    ABO TYPE O     Rh TYPE POS     ANTIBODY SCREEN NEG    Fibrinogen   Result Value Ref Range    Fibrinogen 307 200 - 400 mg/dL   ECG 12 lead   Result Value Ref Range    Ventricular Rate 70 BPM    Atrial Rate 70 BPM    PA Interval 196 ms    QRS Duration 166 ms    QT Interval 438 ms    QTC Calculation(Bazett) 473 ms    P Axis 69 degrees    R Axis -64 degrees    T Axis -15 degrees    QRS Count 11 beats    Q Onset 208 ms    P Onset 110 ms    P Offset 168 ms    T Offset 427 ms    QTC Fredericia 461 ms   Troponin I, High Sensitivity   Result Value Ref Range    Troponin I, High Sensitivity 6 0 - 20 ng/L   CBC and Auto Differential   Result Value Ref Range    WBC 9.8 4.4 - 11.3 x10*3/uL    nRBC 0.0 0.0 - 0.0 /100 WBCs    RBC 3.80 (L) 4.50 - 5.90 x10*6/uL    Hemoglobin 10.7 (L) 13.5 - 17.5 g/dL    Hematocrit 32.2 (L) 41.0 - 52.0 %    MCV 85 80 - 100 fL    MCH 28.2 26.0 - 34.0 pg    MCHC 33.2 32.0 - 36.0 g/dL    RDW 14.5 11.5 - 14.5 %    Platelets 219 150 - 450 x10*3/uL    Neutrophils % 65.4 40.0 - 80.0 %    Immature Granulocytes %, Automated 0.3 0.0 - 0.9 %    Lymphocytes % 25.2 13.0 - 44.0 %    Monocytes % 7.4 2.0 - 10.0 %    Eosinophils % 1.3 0.0 - 6.0 %    Basophils % 0.4 0.0 - 2.0 %    Neutrophils Absolute 6.39 1.20 - 7.70 x10*3/uL    Immature Granulocytes Absolute, Automated 0.03 0.00 - 0.70 x10*3/uL    Lymphocytes Absolute 2.46 1.20 - 4.80 x10*3/uL    Monocytes Absolute 0.72 0.10 - 1.00 x10*3/uL    Eosinophils Absolute 0.13 0.00 - 0.70 x10*3/uL    Basophils Absolute 0.04 0.00 -  0.10 x10*3/uL   Basic Metabolic Panel   Result Value Ref Range    Glucose 94 74 - 99 mg/dL    Sodium 140 136 - 145 mmol/L    Potassium 3.9 3.5 - 5.3 mmol/L    Chloride 108 (H) 98 - 107 mmol/L    Bicarbonate 27 21 - 32 mmol/L    Anion Gap 9 (L) 10 - 20 mmol/L    Urea Nitrogen 14 6 - 23 mg/dL    Creatinine 0.71 0.50 - 1.30 mg/dL    eGFR >90 >60 mL/min/1.73m*2    Calcium 8.4 (L) 8.6 - 10.3 mg/dL   Magnesium   Result Value Ref Range    Magnesium 2.08 1.60 - 2.40 mg/dL   Phosphorus   Result Value Ref Range    Phosphorus 3.7 2.5 - 4.9 mg/dL   CBC and Auto Differential   Result Value Ref Range    WBC 9.5 4.4 - 11.3 x10*3/uL    nRBC 0.0 0.0 - 0.0 /100 WBCs    RBC 3.70 (L) 4.50 - 5.90 x10*6/uL    Hemoglobin 10.3 (L) 13.5 - 17.5 g/dL    Hematocrit 31.6 (L) 41.0 - 52.0 %    MCV 85 80 - 100 fL    MCH 27.8 26.0 - 34.0 pg    MCHC 32.6 32.0 - 36.0 g/dL    RDW 14.2 11.5 - 14.5 %    Platelets 243 150 - 450 x10*3/uL    Neutrophils % 68.7 40.0 - 80.0 %    Immature Granulocytes %, Automated 0.5 0.0 - 0.9 %    Lymphocytes % 22.1 13.0 - 44.0 %    Monocytes % 6.7 2.0 - 10.0 %    Eosinophils % 1.6 0.0 - 6.0 %    Basophils % 0.4 0.0 - 2.0 %    Neutrophils Absolute 6.53 1.20 - 7.70 x10*3/uL    Immature Granulocytes Absolute, Automated 0.05 0.00 - 0.70 x10*3/uL    Lymphocytes Absolute 2.10 1.20 - 4.80 x10*3/uL    Monocytes Absolute 0.64 0.10 - 1.00 x10*3/uL    Eosinophils Absolute 0.15 0.00 - 0.70 x10*3/uL    Basophils Absolute 0.04 0.00 - 0.10 x10*3/uL   Basic Metabolic Panel   Result Value Ref Range    Glucose 124 (H) 74 - 99 mg/dL    Sodium 137 136 - 145 mmol/L    Potassium 3.6 3.5 - 5.3 mmol/L    Chloride 104 98 - 107 mmol/L    Bicarbonate 28 21 - 32 mmol/L    Anion Gap 9 (L) 10 - 20 mmol/L    Urea Nitrogen 14 6 - 23 mg/dL    Creatinine 0.74 0.50 - 1.30 mg/dL    eGFR >90 >60 mL/min/1.73m*2    Calcium 8.4 (L) 8.6 - 10.3 mg/dL   Magnesium   Result Value Ref Range    Magnesium 2.04 1.60 - 2.40 mg/dL   Phosphorus   Result Value Ref Range     Phosphorus 3.7 2.5 - 4.9 mg/dL     ECG 12 lead    Result Date: 8/22/2024  Normal sinus rhythm Right bundle branch block Left anterior fascicular block Abnormal ECG When compared with ECG of 07-SEP-2016 06:13, T wave inversion now evident in Anterior leads Confirmed by Toby De La Rosa ([id number]) on 8/22/2024 2:03:54 PM    XR chest 1 view    Result Date: 8/21/2024  Interpreted By:  Chad Hamilton, STUDY: XR CHEST 1 VIEW;  8/20/2024 5:41 pm   INDICATION: Signs/Symptoms:rib pain.   COMPARISON: None.   ACCESSION NUMBER(S): [id number]   ORDERING CLINICIAN: YULI BACH   FINDINGS: CHEST/LUNGS: The cardiac and mediastinal silhouettes are within normal limits for the technique. No focal areas of consolidation are noted. No effusion or pneumothorax is seen.   Minimal areas of atelectasis at the lung bases.   UPPER ABDOMEN: No remarkable upper abdominal findings.   OSSEOUS STRUCTURES: No acute changes.       No radiographic evidence of an acute cardiopulmonary process.   MACRO: None.   Signed by: Chad Hamilton 8/21/2024 8:01 AM Dictation workstation:   [id number]    US bladder    Result Date: 8/20/2024  Interpreted By:  Santiago Solis, STUDY: US BLADDER;  8/20/2024 1:07 pm   INDICATION: Signs/Symptoms:Severe gross hematuria with clots status post irrigation, check for residual clots or bladder distention at the bedside thanks.   COMPARISON: CT abdomen and pelvis without contrast 19 August 2024   ACCESSION NUMBER(S): [id number]   ORDERING CLINICIAN: LOUIS D'AMICO   TECHNIQUE: Transverse and longitudinal grayscale and color Doppler of the urinary bladder before and after voiding   FINDINGS: Shadowing stone:  Negative Urinary bladder debris:  Cannot evaluate; completely emptied via well-positioned Cabello catheter Other filling defects:  Evaluation incomplete but Cabello catheter balloon is in the lumen Wall thickening:  Cannot evaluate; completely emptied via well-positioned Cabello catheter Diverticula:  Cannot evaluate; completely  emptied via well-positioned Cabello catheter   Other:  Prostate was measured with better sensitivity and specificity on MRI 11 June 2024 that is today. It measured at least 10 x 7 x 7.2 cm on prior MRI with calculated volume around 260 cubic cm       At the time of this exam, the urinary bladder was completely empty with well-positioned Cabello catheter in place. Evaluation of the urinary bladder lumen for pathologic filling defects (anything other than the Cabello catheter) is extremely limited with the urinary bladder being completely collapsed as it is on these images   Markedly enlarged prostate probably underestimated for size on today's exam compared to more sensitive and specific MRI 11 June 2024 when it measured at least 10 x 7 x 7.2 cm with calculated volume around 260 cubic cm   MACRO: None   Signed by: Santiago Solis 8/20/2024 1:36 PM Dictation workstation:   [id number]    CT abdomen pelvis wo IV contrast    Result Date: 8/19/2024  STUDY: CT Abdomen and Pelvis without IV Contrast; 08/19/2024 at 2:21 PM INDICATION: Hematuria. COMPARISON: MRI prostate 06/11/24. ACCESSION NUMBER(S): [id number] ORDERING CLINICIAN: WESTLEY MEJIA TECHNIQUE: CT of the abdomen and pelvis was performed.  Contiguous axial images were obtained at 3 mm slice thickness through the abdomen and pelvis. Coronal and sagittal reconstructions at 3 mm slice thickness were performed. No intravenous contrast was administered.  Automated mA/kV exposure control was utilized and patient examination was performed in strict accordance with principles of ALARA. FINDINGS: Please note that the evaluation of vessels, lymph nodes and organs is limited without intravenous contrast. Evaluation of the abdominal viscera is suboptimal due to lack of IV contrast.  No acute findings are seen within the unenhanced liver, spleen, pancreas, or adrenal glands.  No acute findings are seen within either unenhanced kidney.  There is no evidence of hydronephrosis or  nephrolithiasis.  No ureteral calculi are visualized.  There is high density material in the urinary bladder, indicative of blood.  An underlying mass cannot be excluded.  The prostate gland is severely enlarged at 7.1 x 7.3 cm.  No dilated loops of small bowel or colon are visualized.  There are diverticula seen in the sigmoid colon.  There is no evidence of diverticulitis.  The appendix is normal in appearance.  No enlarged lymph nodes are seen within the pelvic sidewalls, iliac chains, or retroperitoneum.  There is no evidence of aneurysmal dilatation of the abdominal aorta. Degenerative change are seen throughout the lumbar spine.    1.  Presence of blood within urinary bladder.  An underlying mass cannot be excluded.  Correlation with cystoscopy is recommended. 2.  Enlarged prostate gland. 3.  No evidence of hydronephrosis or nephrolithiasis. 4.  Sigmoid diverticulosis, with no evidence of diverticulitis. Signed by Zacarias Rodriguez MD

## 2024-08-22 NOTE — CARE PLAN
The clinical goals for the shift include no blood clots in the mckeon with the glycine drip throughout the shift

## 2024-08-23 ENCOUNTER — DOCUMENTATION (OUTPATIENT)
Dept: HOME HEALTH SERVICES | Facility: HOME HEALTH | Age: 64
End: 2024-08-23
Payer: MEDICARE

## 2024-08-23 ENCOUNTER — HOME HEALTH ADMISSION (OUTPATIENT)
Dept: HOME HEALTH SERVICES | Facility: HOME HEALTH | Age: 64
End: 2024-08-23
Payer: MEDICARE

## 2024-08-23 VITALS
HEIGHT: 73 IN | HEART RATE: 89 BPM | DIASTOLIC BLOOD PRESSURE: 83 MMHG | WEIGHT: 237.22 LBS | OXYGEN SATURATION: 96 % | RESPIRATION RATE: 20 BRPM | SYSTOLIC BLOOD PRESSURE: 141 MMHG | TEMPERATURE: 97.2 F | BODY MASS INDEX: 31.44 KG/M2

## 2024-08-23 LAB
ERYTHROCYTE [DISTWIDTH] IN BLOOD BY AUTOMATED COUNT: 13.8 % (ref 11.5–14.5)
HCT VFR BLD AUTO: 32.8 % (ref 41–52)
HGB BLD-MCNC: 10.9 G/DL (ref 13.5–17.5)
MCH RBC QN AUTO: 28.5 PG (ref 26–34)
MCHC RBC AUTO-ENTMCNC: 33.2 G/DL (ref 32–36)
MCV RBC AUTO: 86 FL (ref 80–100)
NRBC BLD-RTO: 0 /100 WBCS (ref 0–0)
PLATELET # BLD AUTO: 263 X10*3/UL (ref 150–450)
RBC # BLD AUTO: 3.83 X10*6/UL (ref 4.5–5.9)
WBC # BLD AUTO: 8.8 X10*3/UL (ref 4.4–11.3)

## 2024-08-23 PROCEDURE — 36415 COLL VENOUS BLD VENIPUNCTURE: CPT

## 2024-08-23 PROCEDURE — 85027 COMPLETE CBC AUTOMATED: CPT

## 2024-08-23 PROCEDURE — 2500000002 HC RX 250 W HCPCS SELF ADMINISTERED DRUGS (ALT 637 FOR MEDICARE OP, ALT 636 FOR OP/ED): Performed by: UROLOGY

## 2024-08-23 PROCEDURE — 99239 HOSP IP/OBS DSCHRG MGMT >30: CPT | Performed by: INTERNAL MEDICINE

## 2024-08-23 RX ORDER — TAMSULOSIN HYDROCHLORIDE 0.4 MG/1
0.4 CAPSULE ORAL
Start: 2024-08-23 | End: 2024-09-22

## 2024-08-23 ASSESSMENT — PAIN - FUNCTIONAL ASSESSMENT
PAIN_FUNCTIONAL_ASSESSMENT: 0-10
PAIN_FUNCTIONAL_ASSESSMENT: FLACC (FACE, LEGS, ACTIVITY, CRY, CONSOLABILITY)

## 2024-08-23 ASSESSMENT — PAIN SCALES - GENERAL: PAINLEVEL_OUTOF10: 0 - NO PAIN

## 2024-08-23 NOTE — DISCHARGE SUMMARY
DISCHARGE DIAGNOSIS     Hemorrhagic cystitis  BPH  Acute blood loss anemia    HOSPITAL COURSE AND DETAILS     64M with PMH of chronic urinary retention requiring intermittent straight cath at home who presented with acute on chronic urinary retention, inability to straight cath, gross hematuria with clots. CT A/P showed blood products within bladder and enlarged prostate. He was seen by urology, taken for cystoscopy that showed hemorrhagic cystitis with large blood clot burden, had Mckeon placed with CBI, along with q1h manual irrigations requiring ICU care. Hgb had slight down trend during admission due to blood loss. He was stabilized, irrigation stopped and bleeding stopped as well, Hgb was stable, HDS. Ucx had no growth, no abx needed on discharge. Urology recommended to keep mckeon catheter in place for 2 weeks and fu as outpt for TOV. Will cont with Flomax on discharge. Urology plans on possible referral for second opinion to tertiary care center for consideration of possible prostatectomy in the future. He is stable for dc to home with [facility]. Total time spent on discharge services 31 minutes.     DISCHARGE PHYSICAL EXAM     Last Recorded Vitals:  Vitals:    08/22/24 1535 08/22/24 2023 08/23/24 0517 08/23/24 0600   BP: (!) 174/91 (!) 169/97 (!) 159/96    BP Location: Right arm      Patient Position: Lying      Pulse: 73 73 65    Resp: 20      Temp: 36.8 °C (98.2 °F) 36.8 °C (98.2 °F) 36.5 °C (97.7 °F)    TempSrc: Temporal      SpO2: 96% 94% 96%    Weight:   108 kg (237 lb 3.4 oz) 108 kg (237 lb 3.4 oz)   Height:           Physical Exam:  GEN: healthy appearing, appears stated age, NAD  ABD: soft, NT  : mckeon catheter in place with yellow urine  NEURO: A+Ox3, no FND  PSYCH: appropriate mood, affect    PERTINENT LABS AND IMAGING     Results for orders placed or performed during the hospital encounter of 08/19/24 (from the past 96 hour(s))   CBC and Auto Differential   Result Value Ref Range    WBC 12.5 (H) 4.4 -  11.3 x10*3/uL    nRBC 0.0 0.0 - 0.0 /100 WBCs    RBC 4.95 4.50 - 5.90 x10*6/uL    Hemoglobin 13.9 13.5 - 17.5 g/dL    Hematocrit 41.7 41.0 - 52.0 %    MCV 84 80 - 100 fL    MCH 28.1 26.0 - 34.0 pg    MCHC 33.3 32.0 - 36.0 g/dL    RDW 14.1 11.5 - 14.5 %    Platelets 264 150 - 450 x10*3/uL    Neutrophils % 88.8 40.0 - 80.0 %    Immature Granulocytes %, Automated 0.2 0.0 - 0.9 %    Lymphocytes % 6.3 13.0 - 44.0 %    Monocytes % 4.4 2.0 - 10.0 %    Eosinophils % 0.1 0.0 - 6.0 %    Basophils % 0.2 0.0 - 2.0 %    Neutrophils Absolute 11.12 (H) 1.20 - 7.70 x10*3/uL    Immature Granulocytes Absolute, Automated 0.03 0.00 - 0.70 x10*3/uL    Lymphocytes Absolute 0.79 (L) 1.20 - 4.80 x10*3/uL    Monocytes Absolute 0.55 0.10 - 1.00 x10*3/uL    Eosinophils Absolute 0.01 0.00 - 0.70 x10*3/uL    Basophils Absolute 0.03 0.00 - 0.10 x10*3/uL   Comprehensive Metabolic Panel   Result Value Ref Range    Glucose 98 74 - 99 mg/dL    Sodium 138 136 - 145 mmol/L    Potassium 4.4 3.5 - 5.3 mmol/L    Chloride 106 98 - 107 mmol/L    Bicarbonate 25 21 - 32 mmol/L    Anion Gap 11 10 - 20 mmol/L    Urea Nitrogen 14 6 - 23 mg/dL    Creatinine 0.85 0.50 - 1.30 mg/dL    eGFR >90 >60 mL/min/1.73m*2    Calcium 9.2 8.6 - 10.3 mg/dL    Albumin 4.2 3.4 - 5.0 g/dL    Alkaline Phosphatase 45 33 - 136 U/L    Total Protein 7.0 6.4 - 8.2 g/dL    AST 14 9 - 39 U/L    Bilirubin, Total 0.5 0.0 - 1.2 mg/dL    ALT 4 (L) 10 - 52 U/L   Protime-INR   Result Value Ref Range    Protime 11.5 9.8 - 12.8 seconds    INR 1.0 0.9 - 1.1   aPTT   Result Value Ref Range    aPTT 31 27 - 38 seconds   Gray Top   Result Value Ref Range    Extra Tube Hold for add-ons.    CBC and Auto Differential   Result Value Ref Range    WBC 11.7 (H) 4.4 - 11.3 x10*3/uL    nRBC 0.0 0.0 - 0.0 /100 WBCs    RBC 4.13 (L) 4.50 - 5.90 x10*6/uL    Hemoglobin 11.5 (L) 13.5 - 17.5 g/dL    Hematocrit 35.0 (L) 41.0 - 52.0 %    MCV 85 80 - 100 fL    MCH 27.8 26.0 - 34.0 pg    MCHC 32.9 32.0 - 36.0 g/dL     RDW 14.5 11.5 - 14.5 %    Platelets 240 150 - 450 x10*3/uL    Neutrophils % 84.2 40.0 - 80.0 %    Immature Granulocytes %, Automated 0.4 0.0 - 0.9 %    Lymphocytes % 9.3 13.0 - 44.0 %    Monocytes % 5.8 2.0 - 10.0 %    Eosinophils % 0.1 0.0 - 6.0 %    Basophils % 0.2 0.0 - 2.0 %    Neutrophils Absolute 9.84 (H) 1.20 - 7.70 x10*3/uL    Immature Granulocytes Absolute, Automated 0.05 0.00 - 0.70 x10*3/uL    Lymphocytes Absolute 1.09 (L) 1.20 - 4.80 x10*3/uL    Monocytes Absolute 0.68 0.10 - 1.00 x10*3/uL    Eosinophils Absolute 0.01 0.00 - 0.70 x10*3/uL    Basophils Absolute 0.02 0.00 - 0.10 x10*3/uL   Phosphorus   Result Value Ref Range    Phosphorus 3.5 2.5 - 4.9 mg/dL   Procalcitonin   Result Value Ref Range    Procalcitonin 0.03 <=0.07 ng/mL   Urinalysis with Reflex Culture and Microscopic   Result Value Ref Range    Color, Urine Dark-Brown (N) Light-Yellow, Yellow, Dark-Yellow    Appearance, Urine Ex.Turbid (N) Clear    Specific Gravity, Urine 1.016 1.005 - 1.035    pH, Urine 5.5 5.0, 5.5, 6.0, 6.5, 7.0, 7.5, 8.0    Protein, Urine 100 (2+) (A) NEGATIVE, 10 (TRACE), 20 (TRACE) mg/dL    Glucose, Urine Normal Normal mg/dL    Blood, Urine OVER (3+) (A) NEGATIVE    Ketones, Urine NEGATIVE NEGATIVE mg/dL    Bilirubin, Urine NEGATIVE NEGATIVE    Urobilinogen, Urine Normal Normal mg/dL    Nitrite, Urine NEGATIVE NEGATIVE    Leukocyte Esterase, Urine 250 Yolanda/µL (A) NEGATIVE   Extra Urine Gray Tube   Result Value Ref Range    Extra Tube Hold for add-ons.    Microscopic Only, Urine   Result Value Ref Range    WBC, Urine >50 (A) 1-5, NONE /HPF    RBC, Urine >20 (A) NONE, 1-2, 3-5 /HPF   Urine Culture    Specimen: Indwelling (Cabello) Catheter; Urine   Result Value Ref Range    Urine Culture No growth    Procalcitonin   Result Value Ref Range    Procalcitonin 0.07 <=0.07 ng/mL   CBC   Result Value Ref Range    WBC 13.5 (H) 4.4 - 11.3 x10*3/uL    nRBC 0.0 0.0 - 0.0 /100 WBCs    RBC 4.35 (L) 4.50 - 5.90 x10*6/uL    Hemoglobin 12.2  (L) 13.5 - 17.5 g/dL    Hematocrit 36.7 (L) 41.0 - 52.0 %    MCV 84 80 - 100 fL    MCH 28.0 26.0 - 34.0 pg    MCHC 33.2 32.0 - 36.0 g/dL    RDW 14.3 11.5 - 14.5 %    Platelets 253 150 - 450 x10*3/uL   Type and screen   Result Value Ref Range    ABO TYPE O     Rh TYPE POS     ANTIBODY SCREEN NEG    Fibrinogen   Result Value Ref Range    Fibrinogen 307 200 - 400 mg/dL   ECG 12 lead   Result Value Ref Range    Ventricular Rate 70 BPM    Atrial Rate 70 BPM    RI Interval 196 ms    QRS Duration 166 ms    QT Interval 438 ms    QTC Calculation(Bazett) 473 ms    P Axis 69 degrees    R Axis -64 degrees    T Axis -15 degrees    QRS Count 11 beats    Q Onset 208 ms    P Onset 110 ms    P Offset 168 ms    T Offset 427 ms    QTC Fredericia 461 ms   Troponin I, High Sensitivity   Result Value Ref Range    Troponin I, High Sensitivity 6 0 - 20 ng/L   CBC and Auto Differential   Result Value Ref Range    WBC 9.8 4.4 - 11.3 x10*3/uL    nRBC 0.0 0.0 - 0.0 /100 WBCs    RBC 3.80 (L) 4.50 - 5.90 x10*6/uL    Hemoglobin 10.7 (L) 13.5 - 17.5 g/dL    Hematocrit 32.2 (L) 41.0 - 52.0 %    MCV 85 80 - 100 fL    MCH 28.2 26.0 - 34.0 pg    MCHC 33.2 32.0 - 36.0 g/dL    RDW 14.5 11.5 - 14.5 %    Platelets 219 150 - 450 x10*3/uL    Neutrophils % 65.4 40.0 - 80.0 %    Immature Granulocytes %, Automated 0.3 0.0 - 0.9 %    Lymphocytes % 25.2 13.0 - 44.0 %    Monocytes % 7.4 2.0 - 10.0 %    Eosinophils % 1.3 0.0 - 6.0 %    Basophils % 0.4 0.0 - 2.0 %    Neutrophils Absolute 6.39 1.20 - 7.70 x10*3/uL    Immature Granulocytes Absolute, Automated 0.03 0.00 - 0.70 x10*3/uL    Lymphocytes Absolute 2.46 1.20 - 4.80 x10*3/uL    Monocytes Absolute 0.72 0.10 - 1.00 x10*3/uL    Eosinophils Absolute 0.13 0.00 - 0.70 x10*3/uL    Basophils Absolute 0.04 0.00 - 0.10 x10*3/uL   Basic Metabolic Panel   Result Value Ref Range    Glucose 94 74 - 99 mg/dL    Sodium 140 136 - 145 mmol/L    Potassium 3.9 3.5 - 5.3 mmol/L    Chloride 108 (H) 98 - 107 mmol/L    Bicarbonate  27 21 - 32 mmol/L    Anion Gap 9 (L) 10 - 20 mmol/L    Urea Nitrogen 14 6 - 23 mg/dL    Creatinine 0.71 0.50 - 1.30 mg/dL    eGFR >90 >60 mL/min/1.73m*2    Calcium 8.4 (L) 8.6 - 10.3 mg/dL   Magnesium   Result Value Ref Range    Magnesium 2.08 1.60 - 2.40 mg/dL   Phosphorus   Result Value Ref Range    Phosphorus 3.7 2.5 - 4.9 mg/dL   CBC and Auto Differential   Result Value Ref Range    WBC 9.5 4.4 - 11.3 x10*3/uL    nRBC 0.0 0.0 - 0.0 /100 WBCs    RBC 3.70 (L) 4.50 - 5.90 x10*6/uL    Hemoglobin 10.3 (L) 13.5 - 17.5 g/dL    Hematocrit 31.6 (L) 41.0 - 52.0 %    MCV 85 80 - 100 fL    MCH 27.8 26.0 - 34.0 pg    MCHC 32.6 32.0 - 36.0 g/dL    RDW 14.2 11.5 - 14.5 %    Platelets 243 150 - 450 x10*3/uL    Neutrophils % 68.7 40.0 - 80.0 %    Immature Granulocytes %, Automated 0.5 0.0 - 0.9 %    Lymphocytes % 22.1 13.0 - 44.0 %    Monocytes % 6.7 2.0 - 10.0 %    Eosinophils % 1.6 0.0 - 6.0 %    Basophils % 0.4 0.0 - 2.0 %    Neutrophils Absolute 6.53 1.20 - 7.70 x10*3/uL    Immature Granulocytes Absolute, Automated 0.05 0.00 - 0.70 x10*3/uL    Lymphocytes Absolute 2.10 1.20 - 4.80 x10*3/uL    Monocytes Absolute 0.64 0.10 - 1.00 x10*3/uL    Eosinophils Absolute 0.15 0.00 - 0.70 x10*3/uL    Basophils Absolute 0.04 0.00 - 0.10 x10*3/uL   Basic Metabolic Panel   Result Value Ref Range    Glucose 124 (H) 74 - 99 mg/dL    Sodium 137 136 - 145 mmol/L    Potassium 3.6 3.5 - 5.3 mmol/L    Chloride 104 98 - 107 mmol/L    Bicarbonate 28 21 - 32 mmol/L    Anion Gap 9 (L) 10 - 20 mmol/L    Urea Nitrogen 14 6 - 23 mg/dL    Creatinine 0.74 0.50 - 1.30 mg/dL    eGFR >90 >60 mL/min/1.73m*2    Calcium 8.4 (L) 8.6 - 10.3 mg/dL   Magnesium   Result Value Ref Range    Magnesium 2.04 1.60 - 2.40 mg/dL   Phosphorus   Result Value Ref Range    Phosphorus 3.7 2.5 - 4.9 mg/dL   CBC   Result Value Ref Range    WBC 8.8 4.4 - 11.3 x10*3/uL    nRBC 0.0 0.0 - 0.0 /100 WBCs    RBC 3.83 (L) 4.50 - 5.90 x10*6/uL    Hemoglobin 10.9 (L) 13.5 - 17.5 g/dL     Hematocrit 32.8 (L) 41.0 - 52.0 %    MCV 86 80 - 100 fL    MCH 28.5 26.0 - 34.0 pg    MCHC 33.2 32.0 - 36.0 g/dL    RDW 13.8 11.5 - 14.5 %    Platelets 263 150 - 450 x10*3/uL        XR chest 1 view   Final Result   No radiographic evidence of an acute cardiopulmonary process.        MACRO:   None.        Signed by: Chad Hamilton 8/21/2024 8:01 AM   Dictation workstation:   [id number]      US bladder   Final Result   At the time of this exam, the urinary bladder was completely empty   with well-positioned Cabello catheter in place. Evaluation of the   urinary bladder lumen for pathologic filling defects (anything other   than the Cabello catheter) is extremely limited with the urinary   bladder being completely collapsed as it is on these images        Markedly enlarged prostate probably underestimated for size on   today's exam compared to more sensitive and specific MRI 11 June 2024   when it measured at least 10 x 7 x 7.2 cm with calculated volume   around 260 cubic cm        MACRO:   None        Signed by: Santiago Solis 8/20/2024 1:36 PM   Dictation workstation:   [id number]      CT abdomen pelvis wo IV contrast   Final Result   1.  Presence of blood within urinary bladder.  An underlying mass   cannot be excluded.  Correlation with cystoscopy is recommended.   2.  Enlarged prostate gland.   3.  No evidence of hydronephrosis or nephrolithiasis.   4.  Sigmoid diverticulosis, with no evidence of diverticulitis.   Signed by Zacarias Rodriguez MD          No echocardiogram results found for the past 14 days    DISCHARGE MEDICATIONS        Your medication list        START taking these medications        Instructions Last Dose Given Next Dose Due   tamsulosin 0.4 mg 24 hr capsule  Commonly known as: Flomax      Take 1 capsule (0.4 mg) by mouth 2 times a day after meals.                 Where to Get Your Medications        Information about where to get these medications is not yet available    Ask your nurse or doctor about  these medications  tamsulosin 0.4 mg 24 hr capsule         OUTPATIENT FOLLOW-UP     No future appointments.

## 2024-08-23 NOTE — NURSING NOTE
Pt discharged at 1401. Pt was given all paperwork with necessary education, follow ups and prescriptions. Pt declined transport or a wheelchair. Pt escorted to lobby by staff to wait for ride. All pt belongings taken home. Pt sent home with necessary supplies for at home mckeon care.

## 2024-08-23 NOTE — CARE PLAN
The patient's goals for the shift include mckeon catheter education for discharge    The clinical goals for the shift include remain safe and no pain    Over the shift, the patient did make progress toward the following goals.

## 2024-08-23 NOTE — HH CARE COORDINATION
Home Care received a Referral for Nursing. We have processed the referral for a Start of Care on 08/24/2024.     If you have any questions or concerns, please feel free to contact us at [phone]. Follow the prompts, enter your five digit zip code, and you will be directed to your care team on [facility] 1.

## 2024-08-23 NOTE — PROGRESS NOTES
08/23/24 1206   Discharge Planning   Living Arrangements Alone   Support Systems Friends/neighbors;Family members   Assistance Needed mckeon cath care   Type of Residence Private residence   Who is requesting discharge planning? Provider   Home or Post Acute Services In home services   Type of Home Care Services Home nursing visits  ( HHC)   Expected Discharge Disposition HH Services   Does the patient need discharge transport arranged? No   Patient Choice   Provider Choice list and CMS website (https://medicare.gov/care-compare#search) for post-acute Quality and Resource Measure Data were provided and reviewed with: Patient     Pt transferred from critical care to medical floor. To [place] home with mckeon cath. Orders received for  HHC. [facility] but does not have a PCP. Provided pt with PCP list & Dr DAmico to follow for HHC. Referral to [facility] at [facility]. Provided pt information on  HHC & Healthy at Home program.

## 2024-08-23 NOTE — CARE PLAN
The patient's goals for the shift include  Pain will be controlled throughout this shift.    The clinical goals for the shift include will have no blood clots present in mckeon this shift

## 2024-08-24 ENCOUNTER — HOME CARE VISIT (OUTPATIENT)
Dept: HOME HEALTH SERVICES | Facility: HOME HEALTH | Age: 64
End: 2024-08-24
Payer: MEDICARE

## 2024-08-24 VITALS
DIASTOLIC BLOOD PRESSURE: 93 MMHG | WEIGHT: 230 LBS | RESPIRATION RATE: 18 BRPM | SYSTOLIC BLOOD PRESSURE: 120 MMHG | BODY MASS INDEX: 30.48 KG/M2 | HEART RATE: 80 BPM | HEIGHT: 73 IN

## 2024-08-24 PROCEDURE — G0299 HHS/HOSPICE OF RN EA 15 MIN: HCPCS | Mod: HHH

## 2024-08-24 PROCEDURE — 169592 NO-PAY CLAIM PROCEDURE

## 2024-08-24 ASSESSMENT — ENCOUNTER SYMPTOMS
OCCASIONAL FEELINGS OF UNSTEADINESS: 0
CHANGE IN APPETITE: UNCHANGED
LOSS OF SENSATION IN FEET: 0
DENIES PAIN: 1
APPETITE LEVEL: GOOD
DEPRESSION: 0
PERSON REPORTING PAIN: PATIENT
SUBJECTIVE PAIN PROGRESSION: UNCHANGED

## 2024-08-24 ASSESSMENT — PAIN SCALES - PAIN ASSESSMENT IN ADVANCED DEMENTIA (PAINAD)
BREATHING: 0
NEGVOCALIZATION: 0
BODYLANGUAGE: 0
FACIALEXPRESSION: 0
CONSOLABILITY: 0 - NO NEED TO CONSOLE.
BODYLANGUAGE: 0 - RELAXED.
NEGVOCALIZATION: 0 - NONE.
TOTALSCORE: 0
CONSOLABILITY: 0
FACIALEXPRESSION: 0 - SMILING OR INEXPRESSIVE.

## 2024-08-24 ASSESSMENT — ACTIVITIES OF DAILY LIVING (ADL)
AMBULATION ASSISTANCE: INDEPENDENT
ENTERING_EXITING_HOME: STAND BY ASSIST
OASIS_M1830: 01
AMBULATION ASSISTANCE: 1

## 2024-08-29 ENCOUNTER — HOME CARE VISIT (OUTPATIENT)
Dept: HOME HEALTH SERVICES | Facility: HOME HEALTH | Age: 64
End: 2024-08-29
Payer: MEDICARE

## 2024-08-29 VITALS
RESPIRATION RATE: 18 BRPM | HEART RATE: 68 BPM | OXYGEN SATURATION: 99 % | DIASTOLIC BLOOD PRESSURE: 92 MMHG | TEMPERATURE: 98.8 F | SYSTOLIC BLOOD PRESSURE: 158 MMHG

## 2024-08-29 PROCEDURE — G0300 HHS/HOSPICE OF LPN EA 15 MIN: HCPCS | Mod: HHH

## 2024-08-29 ASSESSMENT — ENCOUNTER SYMPTOMS
PERSON REPORTING PAIN: PATIENT
HIGHEST PAIN SEVERITY IN PAST 24 HOURS: 0/10
SUBJECTIVE PAIN PROGRESSION: UNCHANGED
CHANGE IN APPETITE: UNCHANGED
PAIN SEVERITY GOAL: 0/10
APPETITE LEVEL: GOOD
DENIES PAIN: 1
LOWEST PAIN SEVERITY IN PAST 24 HOURS: 0/10

## 2024-09-03 ENCOUNTER — PATIENT OUTREACH (OUTPATIENT)
Dept: HOME HEALTH SERVICES | Age: 64
End: 2024-09-03
Payer: MEDICARE

## 2024-09-03 ENCOUNTER — DOCUMENTATION (OUTPATIENT)
Dept: HOME HEALTH SERVICES | Age: 64
End: 2024-09-03
Payer: MEDICARE

## 2024-09-03 SDOH — ECONOMIC STABILITY: INCOME INSECURITY: IN THE LAST 12 MONTHS, WAS THERE A TIME WHEN YOU WERE NOT ABLE TO PAY THE MORTGAGE OR RENT ON TIME?: NO

## 2024-09-03 SDOH — SOCIAL STABILITY: SOCIAL NETWORK: HOW OFTEN DO YOU GET TOGETHER WITH FRIENDS OR RELATIVES?: MORE THAN THREE TIMES A WEEK

## 2024-09-03 SDOH — HEALTH STABILITY: MENTAL HEALTH: HOW OFTEN DO YOU HAVE 6 OR MORE DRINKS ON ONE OCCASION?: NEVER

## 2024-09-03 SDOH — ECONOMIC STABILITY: INCOME INSECURITY: HOW HARD IS IT FOR YOU TO PAY FOR THE VERY BASICS LIKE FOOD, HOUSING, MEDICAL CARE, AND HEATING?: NOT VERY HARD

## 2024-09-03 SDOH — SOCIAL STABILITY: SOCIAL NETWORK
DO YOU BELONG TO ANY CLUBS OR ORGANIZATIONS SUCH AS CHURCH GROUPS UNIONS, FRATERNAL OR ATHLETIC GROUPS, OR SCHOOL GROUPS?: NO

## 2024-09-03 SDOH — ECONOMIC STABILITY: TRANSPORTATION INSECURITY
IN THE PAST 12 MONTHS, HAS LACK OF TRANSPORTATION KEPT YOU FROM MEETINGS, WORK, OR FROM GETTING THINGS NEEDED FOR DAILY LIVING?: NO

## 2024-09-03 SDOH — ECONOMIC STABILITY: INCOME INSECURITY: IN THE PAST 12 MONTHS, HAS THE ELECTRIC, GAS, OIL, OR WATER COMPANY THREATENED TO SHUT OFF SERVICE IN YOUR HOME?: NO

## 2024-09-03 SDOH — ECONOMIC STABILITY: FOOD INSECURITY: WITHIN THE PAST 12 MONTHS, YOU WORRIED THAT YOUR FOOD WOULD RUN OUT BEFORE YOU GOT MONEY TO BUY MORE.: NEVER TRUE

## 2024-09-03 SDOH — SOCIAL STABILITY: SOCIAL NETWORK: HOW OFTEN DO YOU ATTEND CHURCH OR RELIGIOUS SERVICES?: 1 TO 4 TIMES PER YEAR

## 2024-09-03 SDOH — ECONOMIC STABILITY: HOUSING INSECURITY: AT ANY TIME IN THE PAST 12 MONTHS, WERE YOU HOMELESS OR LIVING IN A SHELTER (INCLUDING NOW)?: NO

## 2024-09-03 SDOH — ECONOMIC STABILITY: FOOD INSECURITY: WITHIN THE PAST 12 MONTHS, THE FOOD YOU BOUGHT JUST DIDN'T LAST AND YOU DIDN'T HAVE MONEY TO GET MORE.: NEVER TRUE

## 2024-09-03 SDOH — SOCIAL STABILITY: SOCIAL NETWORK: ARE YOU MARRIED, WIDOWED, DIVORCED, SEPARATED, NEVER MARRIED, OR LIVING WITH A PARTNER?: NEVER MARRIED

## 2024-09-03 SDOH — ECONOMIC STABILITY: HOUSING INSECURITY: IN THE PAST 12 MONTHS, HOW MANY TIMES HAVE YOU MOVED WHERE YOU WERE LIVING?: 0

## 2024-09-03 SDOH — ECONOMIC STABILITY: TRANSPORTATION INSECURITY
IN THE PAST 12 MONTHS, HAS THE LACK OF TRANSPORTATION KEPT YOU FROM MEDICAL APPOINTMENTS OR FROM GETTING MEDICATIONS?: NO

## 2024-09-03 SDOH — HEALTH STABILITY: MENTAL HEALTH
HOW OFTEN DO YOU NEED TO HAVE SOMEONE HELP YOU WHEN YOU READ INSTRUCTIONS, PAMPHLETS, OR OTHER WRITTEN MATERIAL FROM YOUR DOCTOR OR PHARMACY?: NEVER

## 2024-09-03 SDOH — SOCIAL STABILITY: SOCIAL NETWORK
IN A TYPICAL WEEK, HOW MANY TIMES DO YOU TALK ON THE PHONE WITH FAMILY, FRIENDS, OR NEIGHBORS?: MORE THAN THREE TIMES A WEEK

## 2024-09-03 SDOH — HEALTH STABILITY: MENTAL HEALTH: HOW OFTEN DO YOU HAVE A DRINK CONTAINING ALCOHOL?: MONTHLY OR LESS

## 2024-09-03 SDOH — HEALTH STABILITY: MENTAL HEALTH: HOW MANY STANDARD DRINKS CONTAINING ALCOHOL DO YOU HAVE ON A TYPICAL DAY?: 1 OR 2

## 2024-09-03 SDOH — HEALTH STABILITY: PHYSICAL HEALTH: ON AVERAGE, HOW MANY MINUTES DO YOU ENGAGE IN EXERCISE AT THIS LEVEL?: PATIENT UNABLE TO ANSWER

## 2024-09-03 SDOH — HEALTH STABILITY: PHYSICAL HEALTH: ON AVERAGE, HOW MANY DAYS PER WEEK DO YOU ENGAGE IN MODERATE TO STRENUOUS EXERCISE (LIKE A BRISK WALK)?: 7 DAYS

## 2024-09-03 ASSESSMENT — LIFESTYLE VARIABLES
AUDIT-C TOTAL SCORE: 1
SKIP TO QUESTIONS 9-10: 1

## 2024-09-03 NOTE — PROGRESS NOTES
64 yom, recently admitted for hemorrhagic cystitis, BPH, and blood loss anemia after self cath at home.  Sent home with mckeon in place and [facility]. Pt. Does not have PCP for home care. Quit smoking about 15 years ago. Only takes Flomax since discharge. Hx BPH and hernia repair. Pt. Lives alone. He still works and would like to return to work as soon as possible.

## 2024-09-04 ENCOUNTER — PATIENT OUTREACH (OUTPATIENT)
Dept: HOME HEALTH SERVICES | Age: 64
End: 2024-09-04
Payer: MEDICARE

## 2024-09-04 NOTE — PROGRESS NOTES
Daily Call Note:     Pt answered the phone, but was busy and unable to do the daily call.  We will follow up with him tomorrow.     Pt Education:   Barriers:   Topics for Daily Review:   Pt demonstrates clear understanding:     Daily Weight:  There were no vitals filed for this visit.   Last 3 Weights:  Wt Readings from Last 7 Encounters:   08/24/24 104 kg (230 lb)   08/23/24 108 kg (237 lb 3.4 oz)       Masimo Device:    Masimo Clinical Impression:     Virtual Visits--Scheduled (Most Recent Date at Top)  Follow up Appointments  Recent Visits  No visits were found meeting these conditions.  Showing recent visits within past 30 days and meeting all other requirements  Future Appointments  No visits were found meeting these conditions.  Showing future appointments within next 90 days and meeting all other requirements       Frequency of RN Calls & Virtual Visits per Team Agreement:     Medication issues Addressed (what was done):     Follow up appointments scheduled by [facility] Staff:   Referrals made by [facility] staff:

## 2024-09-05 ENCOUNTER — PATIENT OUTREACH (OUTPATIENT)
Dept: HOME HEALTH SERVICES | Age: 64
End: 2024-09-05
Payer: MEDICARE

## 2024-09-05 ENCOUNTER — HOME CARE VISIT (OUTPATIENT)
Dept: HOME HEALTH SERVICES | Facility: HOME HEALTH | Age: 64
End: 2024-09-05
Payer: MEDICARE

## 2024-09-05 VITALS
TEMPERATURE: 97.3 F | SYSTOLIC BLOOD PRESSURE: 159 MMHG | OXYGEN SATURATION: 95 % | BODY MASS INDEX: 31 KG/M2 | WEIGHT: 235 LBS | DIASTOLIC BLOOD PRESSURE: 90 MMHG | HEART RATE: 86 BPM | RESPIRATION RATE: 18 BRPM

## 2024-09-05 VITALS
RESPIRATION RATE: 18 BRPM | TEMPERATURE: 97.3 F | DIASTOLIC BLOOD PRESSURE: 90 MMHG | HEART RATE: 86 BPM | SYSTOLIC BLOOD PRESSURE: 150 MMHG | OXYGEN SATURATION: 95 %

## 2024-09-05 PROCEDURE — G0300 HHS/HOSPICE OF LPN EA 15 MIN: HCPCS | Mod: HHH

## 2024-09-05 NOTE — HOME HEALTH
SN visit completed. VSS. Denies pain. LS CTA. HR regular. Cabello to CD draining clear yellow urine. No concerns.

## 2024-09-06 ENCOUNTER — TELEMEDICINE CLINICAL SUPPORT (OUTPATIENT)
Dept: CARE COORDINATION | Age: 64
End: 2024-09-06
Payer: MEDICARE

## 2024-09-06 ENCOUNTER — PATIENT OUTREACH (OUTPATIENT)
Dept: HOME HEALTH SERVICES | Age: 64
End: 2024-09-06

## 2024-09-06 DIAGNOSIS — N40.1 BENIGN PROSTATIC HYPERPLASIA WITH URINARY OBSTRUCTION: Primary | ICD-10-CM

## 2024-09-06 DIAGNOSIS — R31.9 HEMATURIA, UNSPECIFIED TYPE: ICD-10-CM

## 2024-09-06 DIAGNOSIS — N31.2 ATONIC NEUROGENIC BLADDER: ICD-10-CM

## 2024-09-06 DIAGNOSIS — N13.8 BENIGN PROSTATIC HYPERPLASIA WITH URINARY OBSTRUCTION: Primary | ICD-10-CM

## 2024-09-06 NOTE — PROGRESS NOTES
Daily Call Note: 9/5 @ 19:58- daily call completed. Pt states he is doing well. Pt has a mckeon, has only had 2 episodes with some blood in his urine when he over exerts himself. Urine cleared up.  Went over signs and symptoms on when to call us or go to the ER. Pt denies any pain. Pt still needs a follow up appointment with urology, dr. Louis D'Amico. Pt will let us know if he needs help scheduling. Pt states he has a PCP that he is trying to make a appointment with. Will update us tomorrow during his [facility] call. Pt denies any swelling or SOB. Since being discharged from the hospital on 8/23, pt has gained 5  Lbs. Has maintained weight of 235 Ibs for the last couple of days. VS's- 150/90, 86, 18, 95%, 97.3, (Per home care today)  [facility] conference call 9/6 @ 1930.     Topics for Daily Review: signs and symptoms to call us or go to the ER  Pt demonstrates clear understanding: Yes    Daily Weight:  There were no vitals filed for this visit.   Last 3 Weights:  Wt Readings from Last 7 Encounters:   08/24/24 104 kg (230 lb)   08/23/24 108 kg (237 lb 3.4 oz)       Masimo Device: No   Masimo Clinical Impression:     Virtual Visits--Scheduled (Most Recent Date at Top)  Follow up Appointments  Recent Visits  No visits were found meeting these conditions.  Showing recent visits within past 30 days and meeting all other requirements  Future Appointments  No visits were found meeting these conditions.  Showing future appointments within next 90 days and meeting all other requirements       Frequency of RN Calls & Virtual Visits per Team Agreement: [facility] Frequency: Daily    Medication issues Addressed (what was done): n    Follow up appointments scheduled by [facility] Staff: n  Referrals made by [facility] staff: n

## 2024-09-07 ENCOUNTER — PATIENT OUTREACH (OUTPATIENT)
Dept: CARE COORDINATION | Age: 64
End: 2024-09-07
Payer: MEDICARE

## 2024-09-07 VITALS
WEIGHT: 235 LBS | DIASTOLIC BLOOD PRESSURE: 91 MMHG | BODY MASS INDEX: 31 KG/M2 | HEART RATE: 75 BPM | SYSTOLIC BLOOD PRESSURE: 151 MMHG

## 2024-09-07 ASSESSMENT — PAIN SCALES - PAIN ASSESSMENT IN ADVANCED DEMENTIA (PAINAD)
CONSOLABILITY: 0
FACIALEXPRESSION: 0 - SMILING OR INEXPRESSIVE.
CONSOLABILITY: 0 - NO NEED TO CONSOLE.
TOTALSCORE: 0
BREATHING: 0
FACIALEXPRESSION: 0
BODYLANGUAGE: 0
NEGVOCALIZATION: 0
BODYLANGUAGE: 0 - RELAXED.
NEGVOCALIZATION: 0 - NONE.

## 2024-09-07 ASSESSMENT — ENCOUNTER SYMPTOMS
PERSON REPORTING PAIN: PATIENT
OCCASIONAL FEELINGS OF UNSTEADINESS: 0
DENIES PAIN: 1

## 2024-09-07 NOTE — PROGRESS NOTES
Daily Call Note: 9/6 @ 19:30- [facility] conference call completed with Charlee CRAIG and Maritza MATTSON. Pt states he is doing well, denies any pain. Pts mckeon is without any complications. Pt still needs a PCP and urologist appointment. Pt states he will call on Monday and try to schedule them. Pt will let up know if he needs help. Pts weight has been 235 Ibs the last 3 days , he weighed himself tonight after eating dinner so it is not accurate.  Pt see Home care.  /90, Pt had no questions or concerns. Next [facility] call 9/13 @ 1930  Pt Education: n  Barriers: n  Topics for Daily Review: pt had no questions or concerns  Pt demonstrates clear understanding: Yes    Daily Weight:  There were no vitals filed for this visit.   Last 3 Weights:  Wt Readings from Last 7 Encounters:   09/05/24 107 kg (235 lb)   08/24/24 104 kg (230 lb)   08/23/24 108 kg (237 lb 3.4 oz)       Masimo Device: No   Masimo Clinical Impression:     Virtual Visits--Scheduled (Most Recent Date at Top)  Follow up Appointments  Recent Visits  No visits were found meeting these conditions.  Showing recent visits within past 30 days and meeting all other requirements  Future Appointments  No visits were found meeting these conditions.  Showing future appointments within next 90 days and meeting all other requirements       Frequency of RN Calls & Virtual Visits per Team Agreement: [facility] Frequency: Daily    Medication issues Addressed (what was done): n    Follow up appointments scheduled by [facility] Staff: n  Referrals made by [facility] staff: n

## 2024-09-07 NOTE — PROGRESS NOTES
"Daily Call Note:   Pt stated his mckeon is without any issues, draining yellow urine, no clots or discoloration noted, but he some discomfort being it is a size \"24\" he stated. Pt still needs a PCP and urologist appointment, and will call on Monday and try to schedule. Pt wants to talk to his urologist, stated he will walk to his office to talk to him Monday, (no appt) because it is a block away he stated. Nurse suggested for pt to call office to see if he could speak to urologist instead. Pt stated he will do that. Also instructed pt on MY CHART enrollment and how to do this. Pt was interested in getting on to send doctor a message, and to see his test results. /91, pt taking medication as prescribed he stated. No other symptoms noted, no other concerns today he stated.    Topics for Daily Review: My Chart enrollment, urology appt, mckeon, BP.  Pt demonstrates clear understanding: Yes    Daily VS:  BP (!) 151/91   Pulse 75   Wt 107 kg (235 lb)   BMI 31.00 kg/m²        Last 3 Weights:  Wt Readings from Last 7 Encounters:   09/05/24 107 kg (235 lb)   08/24/24 104 kg (230 lb)   08/23/24 108 kg (237 lb 3.4 oz)       Voluntiso Device: No       Virtual Visits--Scheduled (Most Recent Date at Top)  Follow up Appointments  Recent Visits  No visits were found meeting these conditions.  Showing recent visits within past 30 days and meeting all other requirements  Future Appointments  No visits were found meeting these conditions.  Showing future appointments within next 90 days and meeting all other requirements       Frequency of RN Calls & Virtual Visits per Team Agreement: [facility] Home Frequency: Daily    Medication issues Addressed (what was done):     Follow up appointments scheduled by [facility] Staff: 9/13 @1930  Referrals made by [facility] staff: Urology        "

## 2024-09-09 ENCOUNTER — HOME CARE VISIT (OUTPATIENT)
Dept: HOME HEALTH SERVICES | Facility: HOME HEALTH | Age: 64
End: 2024-09-09
Payer: MEDICARE

## 2024-09-09 ENCOUNTER — PATIENT OUTREACH (OUTPATIENT)
Dept: HOME HEALTH SERVICES | Age: 64
End: 2024-09-09
Payer: MEDICARE

## 2024-09-09 VITALS — BODY MASS INDEX: 31 KG/M2 | WEIGHT: 235 LBS

## 2024-09-09 NOTE — PROGRESS NOTES
Daily Call Note:  [facility] daily call complete.  Pt reports he is feeling well.  Denies CP/SOB/edema.    B/P 139/85  No other questions/concerns voiced.    Verified upcoming weekly [facility] call.       Pt Education:  POC   Barriers:   Topics for Daily Review:  VS/ weight   Pt demonstrates clear understanding: Yes    Daily Weight:  235 lbs   Vitals:    09/09/24 1638   Weight: 107 kg (235 lb)      Last 3 Weights:  Wt Readings from Last 7 Encounters:   09/09/24 107 kg (235 lb)   09/07/24 107 kg (235 lb)   09/05/24 107 kg (235 lb)   08/24/24 104 kg (230 lb)   08/23/24 108 kg (237 lb 3.4 oz)       Masimo Device: No   Masimo Clinical Impression:     Virtual Visits--Scheduled (Most Recent Date at Top)  Follow up Appointments  Recent Visits  No visits were found meeting these conditions.  Showing recent visits within past 30 days and meeting all other requirements  Future Appointments  No visits were found meeting these conditions.  Showing future appointments within next 90 days and meeting all other requirements       Frequency of RN Calls & Virtual Visits per Team Agreement: [facility] Home Frequency: Daily    Medication issues Addressed (what was done):     Follow up appointments scheduled by [facility] Staff:   Referrals made by [facility] staff:

## 2024-09-10 ENCOUNTER — HOME CARE VISIT (OUTPATIENT)
Dept: HOME HEALTH SERVICES | Facility: HOME HEALTH | Age: 64
End: 2024-09-10
Payer: MEDICARE

## 2024-09-10 ENCOUNTER — PATIENT OUTREACH (OUTPATIENT)
Dept: HOME HEALTH SERVICES | Age: 64
End: 2024-09-10
Payer: MEDICARE

## 2024-09-10 NOTE — PROGRESS NOTES
Daily Call Note: [facility] daily call complete.  Pt reports he is feeling well.  Denies CP/SOB/edema.    B/P 146/99  HR 75    No other questions/concerns voiced.    Verified upcoming weekly [facility] call.       Pt Education: POC  Barriers:   Topics for Daily Review:   Pt demonstrates clear understanding: Yes    Daily Weight:  235 lbs  There were no vitals filed for this visit.   Last 3 Weights:  Wt Readings from Last 7 Encounters:   09/09/24 107 kg (235 lb)   09/07/24 107 kg (235 lb)   09/05/24 107 kg (235 lb)   08/24/24 104 kg (230 lb)   08/23/24 108 kg (237 lb 3.4 oz)       Masimo Device: No   Masimo Clinical Impression:    Virtual Visits--Scheduled (Most Recent Date at Top)  Follow up Appointments  Recent Visits  No visits were found meeting these conditions.  Showing recent visits within past 30 days and meeting all other requirements  Future Appointments  No visits were found meeting these conditions.  Showing future appointments within next 90 days and meeting all other requirements       Frequency of RN Calls & Virtual Visits per Team Agreement: [facility] Home Frequency: Daily    Medication issues Addressed (what was done):     Follow up appointments scheduled by [facility] Staff:   Referrals made by [facility] staff:

## 2024-09-11 ENCOUNTER — PATIENT OUTREACH (OUTPATIENT)
Dept: HOME HEALTH SERVICES | Age: 64
End: 2024-09-11
Payer: MEDICARE

## 2024-09-12 ENCOUNTER — HOME CARE VISIT (OUTPATIENT)
Dept: HOME HEALTH SERVICES | Facility: HOME HEALTH | Age: 64
End: 2024-09-12
Payer: MEDICARE

## 2024-09-12 ENCOUNTER — PATIENT OUTREACH (OUTPATIENT)
Dept: HOME HEALTH SERVICES | Age: 64
End: 2024-09-12
Payer: MEDICARE

## 2024-09-12 VITALS — OXYGEN SATURATION: 98 % | RESPIRATION RATE: 18 BRPM | HEART RATE: 80 BPM | TEMPERATURE: 98.2 F

## 2024-09-12 PROCEDURE — G0300 HHS/HOSPICE OF LPN EA 15 MIN: HCPCS | Mod: HHH

## 2024-09-12 ASSESSMENT — ENCOUNTER SYMPTOMS
SUBJECTIVE PAIN PROGRESSION: UNCHANGED
PAIN SEVERITY GOAL: 0/10
HIGHEST PAIN SEVERITY IN PAST 24 HOURS: 0/10
CHANGE IN APPETITE: UNCHANGED
APPETITE LEVEL: GOOD
LOWEST PAIN SEVERITY IN PAST 24 HOURS: 0/10
DENIES PAIN: 1

## 2024-09-12 NOTE — PROGRESS NOTES
Daily Call Note: 9/11 @ 22:09- pt called back for his daily call. Pt states he talked with his urologist and Home care is suppose to remove his mckeon tomorrow. They did not set up a follow up appointment, Pt is discouraged over the lack of communication with his urologist. Pt thinks he should have had his mckeon removed last week. Pt also needs a PCP assigned. Pt does not want any help with that yet. He says he is waiting for recommendations from his urologist and if he does not give him one he has one he will call.  VS's 131/93, 83.  Pts weight 235 Ibs. Pt deneis any pain. Pt has no questions, Next [facility] call 9/13 @ 1930  Pt Education: y  Barriers: n  Topics for Daily Review: following up with his urologist, PCP  Pt demonstrates clear understanding: Yes    Daily Weight:9/11/2024- 107 kg (235 Ibs)  There were no vitals filed for this visit.   Last 3 Weights:  Wt Readings from Last 7 Encounters:   09/09/24 107 kg (235 lb)   09/07/24 107 kg (235 lb)   09/05/24 107 kg (235 lb)   08/24/24 104 kg (230 lb)   08/23/24 108 kg (237 lb 3.4 oz)       Masimo Device: No   Masimo Clinical Impression: n    Virtual Visits--Scheduled (Most Recent Date at Top)  Follow up Appointments  Recent Visits  No visits were found meeting these conditions.  Showing recent visits within past 30 days and meeting all other requirements  Future Appointments  No visits were found meeting these conditions.  Showing future appointments within next 90 days and meeting all other requirements       Frequency of RN Calls & Virtual Visits per Team Agreement: [facility] at Home Frequency: Daily    Medication issues Addressed (what was done): n    Follow up appointments scheduled by [facility] Staff: n  Referrals made by [facility] staff: n

## 2024-09-13 ENCOUNTER — APPOINTMENT (OUTPATIENT)
Dept: CARE COORDINATION | Age: 64
End: 2024-09-13
Payer: MEDICARE

## 2024-09-13 ENCOUNTER — PATIENT OUTREACH (OUTPATIENT)
Dept: HOME HEALTH SERVICES | Age: 64
End: 2024-09-13

## 2024-09-13 DIAGNOSIS — R31.9 HEMATURIA, UNSPECIFIED TYPE: Primary | ICD-10-CM

## 2024-09-13 NOTE — PROGRESS NOTES
"[facility] Weekly Provider Appointment/Time: 1930  Attendees: Patient/Provider: Charlee CHRISTINA/Marly Frias RN  Identified Patient via Full Name and Date of Birth  He noted it is the \"same old, same old\"  His catheter was removed yesterday and he feels better  No bleeding but he has to straight cath and is hoping it will get better  Noted it is terrifying to straight cath, but he is managing  No complaint of symptoms: no fevers, chills, chest pain, or palpitations assessed per CARRI Domínguez CNP  He noted some urine leakage, light reddish  Did not note pain at this time  He will call Urologist Monday, declined assistance with scheduling Urology appointment; office closed today  He noted he wonders, \"Why me, what did I do to have this happen\"?  Was present with him as he asked this and supported him with active listening and empathy  He will speak to Urologist about a referral to a PCP he recommends; he has also been researching PCPs near his home; knows he can ask for assistance with scheduling if needed  Acknowledged he seems very motivated in his healthy journey and he agreed  He noted he feels younger than he is, eats well, works hard, no substances   Stress Assessment: he noted he is usually calm  Self-care: self-talk to calm down, deals with stress and feels emotions, noted he is level headed and coping  He is at a resort with friends tonight and he participates in a golf league and they have a golf tournament tomorrow    Time  Heart Rate BP Weight   0900   233 pounds when first up in am, before meals   1030 72 139/85      Future Appointments: 09.19.2024 Home Health  [facility] Weekly Provider Appointment scheduled 09.20.2024 1930  RN Daily Outreach     Pt Education: Assessed Stress Experience and he is coping and has social support and leisure activities, self-talk  Barriers: None  Topics for Daily Review: as the needs arise  Pt demonstrates clear understanding: Yes    Daily Weight:  There were no vitals " filed for this visit.   Last 3 Weights:  Wt Readings from Last 7 Encounters:   09/09/24 107 kg (235 lb)   09/07/24 107 kg (235 lb)   09/05/24 107 kg (235 lb)   08/24/24 104 kg (230 lb)   08/23/24 108 kg (237 lb 3.4 oz)   09/11/24 107 kg (235 lb)       Masimo Device: No   Masimo Clinical Impression: None    Virtual Visits--Scheduled (Most Recent Date at Top)  Follow up Appointments  Recent Visits  No visits were found meeting these conditions.  Showing recent visits within past 30 days and meeting all other requirements  Future Appointments  No visits were found meeting these conditions.  Showing future appointments within next 90 days and meeting all other requirements       Frequency of RN Calls: daily & Virtual Visits per Team Agreement: 09.20.2024 at 1930    Medication issues Addressed (what was done): None    Follow up appointments scheduled by [facility] Staff: None    Referrals made by [facility] staff: None

## 2024-09-13 NOTE — PROGRESS NOTES
Daily Call Note: 9/12 @ 19:45- Pt states he is doing fine but sounds down. When I pointed out that he sounds sad he said Home care came out and removed his mckeon, tired of having to straight cath. Pt states he has been doing it for 5-6 years, does not have any questions concerning self catherization. Pt states he is going to call his urologist tomorrow and make a follow up appointment.  Pt still wants to make his own PCP appointment. VS's per home care 98.2, 80, 18, 98%, no BP documented. Pt did not weigh himself today, states he was to involved with home care. Encouragement provided. Next [facility] call 9/13 @ 19:30.   Pt Education: y  Barriers: n  Topics for Daily Review: self cath, follow up calls  Pt demonstrates clear understanding: Yes    Daily Weight:  There were no vitals filed for this visit.   Last 3 Weights:  Wt Readings from Last 7 Encounters:   09/09/24 107 kg (235 lb)   09/07/24 107 kg (235 lb)   09/05/24 107 kg (235 lb)   08/24/24 104 kg (230 lb)   08/23/24 108 kg (237 lb 3.4 oz)   09/11/24 107 kg (235 lb)       Masimo Device: No   Masimo Clinical Impression:     Virtual Visits--Scheduled (Most Recent Date at Top)  Follow up Appointments  Recent Visits  No visits were found meeting these conditions.  Showing recent visits within past 30 days and meeting all other requirements  Future Appointments  No visits were found meeting these conditions.  Showing future appointments within next 90 days and meeting all other requirements       Frequency of RN Calls & Virtual Visits per Team Agreement: Healthy at Home Frequency: Daily    Medication issues Addressed (what was done): n    Follow up appointments scheduled by [facility] Staff: n  Referrals made by [facility] staff: n

## 2024-09-14 ENCOUNTER — PATIENT OUTREACH (OUTPATIENT)
Dept: HOME HEALTH SERVICES | Age: 64
End: 2024-09-14
Payer: MEDICARE

## 2024-09-15 VITALS — HEART RATE: 68 BPM | DIASTOLIC BLOOD PRESSURE: 83 MMHG | SYSTOLIC BLOOD PRESSURE: 142 MMHG

## 2024-09-15 NOTE — PROGRESS NOTES
[facility] RN Daily Outreach/Time: 2010  Attendees: Patient,/Marly MAKAYLA Frias RN  Identified Patient via Full Name and Date of Birth  Feels tired   The [employer] tournament was an all day event and fun and now he is shopping with a neighbor  [place]  He continues to straight cath, urine is yellow, no blood  No symtpoms, no pain, only pain with straight cath     Time  Heart Rate BP Weight   AM 68 142/83 234 pounds/he feels that he is a pound heavier due to wearing more clothes than yesteday     Future Appointments:  09.19.2024: Home Health  [facility] Weekly Provider Appointment scheduled: 09.20.2024 1930  RN Daily Outreach     Pt Education: none  Barriers: no  Topics for Daily Review: as needs arise  Pt demonstrates clear understanding: N/A    Daily Weight:  There were no vitals filed for this visit.   Last 3 Weights:  Wt Readings from Last 7 Encounters:   09/09/24 107 kg (235 lb)   09/07/24 107 kg (235 lb)   09/05/24 107 kg (235 lb)   08/24/24 104 kg (230 lb)   08/23/24 108 kg (237 lb 3.4 oz)   09/13/24 106 kg (233 lb)       Masimo Device: No   Masimo Clinical Impression: N/A    Virtual Visits--Scheduled (Most Recent Date at Top)  Follow up Appointments  Recent Visits  No visits were found meeting these conditions.  Showing recent visits within past 30 days and meeting all other requirements  Future Appointments  No visits were found meeting these conditions.  Showing future appointments within next 90 days and meeting all other requirements       Frequency of RN Calls: Daily & Virtual Visits per Team Agreement: 09.20.2024 1930    Medication issues Addressed (what was done): none    Follow up appointments scheduled by [facility] Staff: none    Referrals made by [facility] staff: none

## 2024-09-16 ENCOUNTER — PATIENT OUTREACH (OUTPATIENT)
Dept: CARE COORDINATION | Age: 64
End: 2024-09-16
Payer: MEDICARE

## 2024-09-16 VITALS
DIASTOLIC BLOOD PRESSURE: 95 MMHG | SYSTOLIC BLOOD PRESSURE: 145 MMHG | BODY MASS INDEX: 31 KG/M2 | WEIGHT: 235 LBS | HEART RATE: 68 BPM

## 2024-09-16 NOTE — PROGRESS NOTES
Daily Call Note:   Daily call completed with pt who states he got busy with work and id not have a chance to call about a PCP today but states he will call in the morning.  Reviewed the importance of having a PCP.  Pt states he is feeling well his weight is stable at 235# and /95, HR 68.  Pt states he takes his medications as directed and has no further questions.  Next [facility] call confirmed with pt on 9/20 @ 1930.  Pt Education: importance of finding a PCP   Barriers: none  Topics for Daily Review: does pt have PCP   Pt demonstrates clear understanding: Yes    Daily Weight:  There were no vitals filed for this visit.   Last 3 Weights:  Wt Readings from Last 7 Encounters:   09/09/24 107 kg (235 lb)   09/07/24 107 kg (235 lb)   09/05/24 107 kg (235 lb)   08/24/24 104 kg (230 lb)   08/23/24 108 kg (237 lb 3.4 oz)   09/13/24 106 kg (233 lb)       Masimo Device: No   Masimo Clinical Impression: none    Virtual Visits--Scheduled (Most Recent Date at Top)  Follow up Appointments  Recent Visits  No visits were found meeting these conditions.  Showing recent visits within past 30 days and meeting all other requirements  Future Appointments  No visits were found meeting these conditions.  Showing future appointments within next 90 days and meeting all other requirements       Frequency of RN Calls & Virtual Visits per Team Agreement: Healthy at Home Frequency: Daily    Medication issues Addressed (what was done): none    Follow up appointments scheduled by [facility] Staff: n/a  Referrals made by [facility] staff: n/a

## 2024-09-17 ENCOUNTER — PATIENT OUTREACH (OUTPATIENT)
Dept: HOME HEALTH SERVICES | Age: 64
End: 2024-09-17
Payer: MEDICARE

## 2024-09-17 VITALS
BODY MASS INDEX: 31 KG/M2 | DIASTOLIC BLOOD PRESSURE: 85 MMHG | WEIGHT: 235 LBS | SYSTOLIC BLOOD PRESSURE: 144 MMHG | HEART RATE: 68 BPM

## 2024-09-17 NOTE — PROGRESS NOTES
Daily Call Note:   Patient states he is waiting to speak with his Urologist for information on a PCP.  I advised patient if need the Healthy at Home team can assist.      Patient states he is feeling fine today.      /85  HR 68  Weight 235    Patient has no questions, concerns, or needs at this time.        Pt Education:   Barriers:   Topics for Daily Review:   Pt demonstrates clear understanding:     Daily Weight:  There were no vitals filed for this visit.   Last 3 Weights:  Wt Readings from Last 7 Encounters:   09/16/24 107 kg (235 lb)   09/09/24 107 kg (235 lb)   09/07/24 107 kg (235 lb)   09/05/24 107 kg (235 lb)   08/24/24 104 kg (230 lb)   08/23/24 108 kg (237 lb 3.4 oz)   09/13/24 106 kg (233 lb)       Masimo Device:    Masimo Clinical Impression:     Virtual Visits--Scheduled (Most Recent Date at Top)  Follow up Appointments  Recent Visits  No visits were found meeting these conditions.  Showing recent visits within past 30 days and meeting all other requirements  Future Appointments  No visits were found meeting these conditions.  Showing future appointments within next 90 days and meeting all other requirements       Frequency of RN Calls & Virtual Visits per Team Agreement:     Medication issues Addressed (what was done):     Follow up appointments scheduled by [facility] Staff:   Referrals made by [facility] staff:

## 2024-09-18 ENCOUNTER — PATIENT OUTREACH (OUTPATIENT)
Dept: HOME HEALTH SERVICES | Age: 64
End: 2024-09-18
Payer: MEDICARE

## 2024-09-18 VITALS
WEIGHT: 235 LBS | BODY MASS INDEX: 31 KG/M2 | HEART RATE: 78 BPM | SYSTOLIC BLOOD PRESSURE: 131 MMHG | DIASTOLIC BLOOD PRESSURE: 93 MMHG

## 2024-09-18 NOTE — PROGRESS NOTES
Daily Call Note:     Mr. Wells is feeling well today.  Mr. Wells states he will see his Urologist next week and he should have a PCP by then.       131/93  HR 78   Wt 235 lb     Patient has no questions, concerns, or needs at this time.         Pt Education:   Barriers:   Topics for Daily Review:   Pt demonstrates clear understanding:     Daily Weight:  There were no vitals filed for this visit.   Last 3 Weights:  Wt Readings from Last 7 Encounters:   09/17/24 107 kg (235 lb)   09/16/24 107 kg (235 lb)   09/09/24 107 kg (235 lb)   09/07/24 107 kg (235 lb)   09/05/24 107 kg (235 lb)   08/24/24 104 kg (230 lb)   08/23/24 108 kg (237 lb 3.4 oz)       Masimo Device:    Masimo Clinical Impression:     Virtual Visits--Scheduled (Most Recent Date at Top)  Follow up Appointments  Recent Visits  No visits were found meeting these conditions.  Showing recent visits within past 30 days and meeting all other requirements  Future Appointments  No visits were found meeting these conditions.  Showing future appointments within next 90 days and meeting all other requirements       Frequency of RN Calls & Virtual Visits per Team Agreement:     Medication issues Addressed (what was done):     Follow up appointments scheduled by [facility] Staff:   Referrals made by [facility] staff:

## 2024-09-19 ENCOUNTER — PATIENT OUTREACH (OUTPATIENT)
Dept: HOME HEALTH SERVICES | Age: 64
End: 2024-09-19
Payer: MEDICARE

## 2024-09-20 ENCOUNTER — PATIENT OUTREACH (OUTPATIENT)
Dept: HOME HEALTH SERVICES | Age: 64
End: 2024-09-20

## 2024-09-20 ENCOUNTER — APPOINTMENT (OUTPATIENT)
Dept: CARE COORDINATION | Age: 64
End: 2024-09-20
Payer: MEDICARE

## 2024-09-20 ENCOUNTER — HOME CARE VISIT (OUTPATIENT)
Dept: HOME HEALTH SERVICES | Facility: HOME HEALTH | Age: 64
End: 2024-09-20
Payer: MEDICARE

## 2024-09-20 DIAGNOSIS — R31.9 HEMATURIA, UNSPECIFIED TYPE: Primary | ICD-10-CM

## 2024-09-20 NOTE — PROGRESS NOTES
Healthy at Home Nursing Note: 2025    Attempted a Daily RN Outreach, unable to leave a message, voice mail full  Next RN Outreach: 09.20.2024

## 2024-09-20 NOTE — PROGRESS NOTES
[facility] Weekly Provider Appointment/Time: 1930  Attendees: Patient/Provider: Dr. Salmeron/Marly Frias RN  Identified Patient via Full Name and Date of Birth  No concern. all is fine  Catheter removed last week  Every day gets better, straight caths continue to be painful - urinary tract sore after the continuous catheter   Less painful each day  Scheduled with Urologist Monday and is expecting provider to refer to a PCP; he also has a list of PCPs to call   Asymptomatic  He noted he is getting back to normal    Heart Rate BP Weight   82 138/95 236 pounds     Future Appointments:   9.23.2024: Urology  [facility] Weekly Provider Appointment scheduled 09.28.2024 1930  RN Outreach: Monday/Friday     Pt Education: none today  Barriers: none  Topics for Daily Review: as needs arise  Pt demonstrates clear understanding: N/A    Daily Weight:  There were no vitals filed for this visit.   Last 3 Weights:  Wt Readings from Last 7 Encounters:   09/18/24 107 kg (235 lb)   09/17/24 107 kg (235 lb)   09/16/24 107 kg (235 lb)   09/09/24 107 kg (235 lb)   09/07/24 107 kg (235 lb)   09/05/24 107 kg (235 lb)   08/24/24 104 kg (230 lb)       Masimo Device: No   Masimo Clinical Impression: N/A    Virtual Visits--Scheduled (Most Recent Date at Top)  Follow up Appointments  Recent Visits  No visits were found meeting these conditions.  Showing recent visits within past 30 days and meeting all other requirements  Future Appointments  No visits were found meeting these conditions.  Showing future appointments within next 90 days and meeting all other requirements       Frequency of RN Calls : Monday/Friday & Virtual Visits per Team Agreement: 09/28/20-24 @ 1930    Medication issues Addressed (what was done): none    Follow up appointments scheduled by [facility] Staff: none    Referrals made by [facility] staff: none

## 2024-09-27 ENCOUNTER — PATIENT OUTREACH (OUTPATIENT)
Dept: HOME HEALTH SERVICES | Age: 64
End: 2024-09-27
Payer: MEDICARE

## 2024-09-28 ENCOUNTER — APPOINTMENT (OUTPATIENT)
Dept: CARE COORDINATION | Age: 64
End: 2024-09-28
Payer: MEDICARE

## 2024-09-28 ENCOUNTER — PATIENT OUTREACH (OUTPATIENT)
Dept: HOME HEALTH SERVICES | Age: 64
End: 2024-09-28

## 2024-09-28 VITALS
BODY MASS INDEX: 31 KG/M2 | HEART RATE: 66 BPM | DIASTOLIC BLOOD PRESSURE: 93 MMHG | WEIGHT: 235 LBS | SYSTOLIC BLOOD PRESSURE: 138 MMHG

## 2024-09-28 DIAGNOSIS — R31.1 BENIGN ESSENTIAL MICROSCOPIC HEMATURIA: Primary | ICD-10-CM

## 2024-09-28 NOTE — PROGRESS NOTES
Daily Call Note:   Patient stated that he is doing well. Denies any new or worsening symptoms. Reported that pain while performing straight cath is improving daily. Patient has a PCP appointment 10/28. Patient reminded of [facility] tomorrow at 9/28 @1930  Pt Education: Reporting hematuria and new or worsening pain  Barriers: None  Topics for Daily Review: upcoming PCP appt and pain  Pt demonstrates clear understanding: Yes    Daily Weight:  There were no vitals filed for this visit.   Last 3 Weights:  Wt Readings from Last 7 Encounters:   09/18/24 107 kg (235 lb)   09/17/24 107 kg (235 lb)   09/16/24 107 kg (235 lb)   09/09/24 107 kg (235 lb)   09/07/24 107 kg (235 lb)   09/05/24 107 kg (235 lb)   08/24/24 104 kg (230 lb)       Masimo Device: No   Masimo Clinical Impression: N/A    Virtual Visits--Scheduled (Most Recent Date at Top)  Follow up Appointments  Recent Visits  No visits were found meeting these conditions.  Showing recent visits within past 30 days and meeting all other requirements  Future Appointments  No visits were found meeting these conditions.  Showing future appointments within next 90 days and meeting all other requirements       Frequency of RN Calls & Virtual Visits per Team Agreement: [facility] at Home Frequency: M F    Medication issues Addressed (what was done):     Follow up appointments scheduled by [facility] Staff:   Referrals made by [facility] staff:

## 2024-09-28 NOTE — PROGRESS NOTES
Daily Call Note:   [facility] completed with patient & Dr. Andres.   Patient is doing well today. Had recent follow up with urology. Made appointment with new PCP 10/28 at [facility].   No hematuria. Denies CP, SOB, dizziness.     Weight 235 lbs.   /93, HR 66    Denies any needs or concerns from us at this point in care.  Patient & provider agreeable to graduate from [facility] at Home at this time.     Pt Education: none  Barriers: none  Topics for Daily Review: graduated  Pt demonstrates clear understanding: Yes    Daily Weight:  There were no vitals filed for this visit.   Last 3 Weights:  Wt Readings from Last 7 Encounters:   09/18/24 107 kg (235 lb)   09/17/24 107 kg (235 lb)   09/16/24 107 kg (235 lb)   09/09/24 107 kg (235 lb)   09/07/24 107 kg (235 lb)   09/05/24 107 kg (235 lb)   08/24/24 104 kg (230 lb)       Masimo Device: No   Masimo Clinical Impression: none    Virtual Visits--Scheduled (Most Recent Date at Top)  Follow up Appointments  Recent Visits  No visits were found meeting these conditions.  Showing recent visits within past 30 days and meeting all other requirements  Future Appointments  No visits were found meeting these conditions.  Showing future appointments within next 90 days and meeting all other requirements       Frequency of RN Calls & Virtual Visits per Team Agreement: Healthy at Home Frequency: graduated    Medication issues Addressed (what was done): none    Follow up appointments scheduled by [facility] Staff: none  Referrals made by [facility] staff: none

## 2024-10-01 ASSESSMENT — PAIN SCALES - PAIN ASSESSMENT IN ADVANCED DEMENTIA (PAINAD)
TOTALSCORE: 0
CONSOLABILITY: 0
FACIALEXPRESSION: 0 - SMILING OR INEXPRESSIVE.
BODYLANGUAGE: 0
CONSOLABILITY: 0 - NO NEED TO CONSOLE.
BODYLANGUAGE: 0 - RELAXED.
NEGVOCALIZATION: 0
BREATHING: 0
NEGVOCALIZATION: 0 - NONE.
FACIALEXPRESSION: 0

## 2024-10-01 ASSESSMENT — ACTIVITIES OF DAILY LIVING (ADL)
HOME_HEALTH_OASIS: 00
AMBULATION ASSISTANCE: 1
AMBULATION ASSISTANCE: INDEPENDENT
OASIS_M1830: 00

## 2024-10-01 ASSESSMENT — ENCOUNTER SYMPTOMS: DENIES PAIN: 1

## (undated) DEVICE — BOWL, BASIN, 32 OZ, STERILE

## (undated) DEVICE — STRAP, ARM BOARD, 32 X 1.5

## (undated) DEVICE — SOLUTION, IRRIGATION, USP, STERILE WATER, UROLOGICAL, 3000 ML, BAG

## (undated) DEVICE — GOWN,SIRUS,POLYRNF,BRTHSLV,XLN/XL,20/CS: Brand: MEDLINE

## (undated) DEVICE — TRAY, SKIN SCRUB, WET PREP, WITH 4 COMPARMENT

## (undated) DEVICE — SET,IRRIGATION,CYSTO/TUR: Brand: MEDLINE

## (undated) DEVICE — JELLY,LUBE,STERILE,FLIP TOP,TUBE,2-OZ: Brand: MEDLINE

## (undated) DEVICE — Device

## (undated) DEVICE — DRESSING, NON-ADHERENT, TELFA, OUCHLESS, 3 X 8 IN, STERILE

## (undated) DEVICE — CORD, MONOPOLARD, 10FT, DISP

## (undated) DEVICE — LABEL MED MINI W/ MARKER

## (undated) DEVICE — GLOVE ORANGE PI 8   MSG9080

## (undated) DEVICE — GLOVE, PROTEXIS PI CLASSIC, SZ-8.0, PF, PF, LF

## (undated) DEVICE — DRAPE, UNDERBUTTOCKS

## (undated) DEVICE — COVER,TABLE,44X90,STERILE: Brand: MEDLINE

## (undated) DEVICE — DBD-PACK,CYSTOSCOPY,PK VI,AURORA: Brand: MEDLINE

## (undated) DEVICE — HOLSTER, ELECTROSURGERY ACCESSORY, STERILE

## (undated) DEVICE — MAX-CORE® DISPOSABLE CORE BIOPSY INSTRUMENT, 18G X 20CM: Brand: MAX-CORE

## (undated) DEVICE — GOWN,AURORA,NONREINFORCED,LARGE: Brand: MEDLINE

## (undated) DEVICE — GOWN, SURGICAL, ROYAL SILK, XL, STERILE

## (undated) DEVICE — SWABSTICK MEDICATED 10% POVIDONE IOD PVP SGL ANTISEP SAT

## (undated) DEVICE — PAD N ADH W3XL4IN POLY COT SFT PERF FLM EASILY CUT ABSRB

## (undated) DEVICE — TOWEL,OR,DSP,ST,BLUE,STD,4/PK,20PK/CS: Brand: MEDLINE

## (undated) DEVICE — SOLUTION, IRRIGATION, USP, GLYCINE 1.5%, 3000 ML, BAG

## (undated) DEVICE — COVER, FOOTSWITCH, PEDAL, WIDE

## (undated) DEVICE — TRAY, MINOR, SINGLE BASIN, STERILE

## (undated) DEVICE — GAUZE,SPONGE,4"X4",16PLY,XRAY,STRL,LF: Brand: MEDLINE

## (undated) DEVICE — TRAY PREP DRY W/ PREM GLV 2 APPL 6 SPNG 2 UNDPD 1 OVERWRAP